# Patient Record
Sex: MALE | Race: WHITE
[De-identification: names, ages, dates, MRNs, and addresses within clinical notes are randomized per-mention and may not be internally consistent; named-entity substitution may affect disease eponyms.]

---

## 2017-03-15 ENCOUNTER — HOSPITAL ENCOUNTER (OUTPATIENT)
Dept: HOSPITAL 23 - CAMB | Age: 82
Discharge: HOME | End: 2017-03-15
Payer: MEDICARE

## 2017-03-15 DIAGNOSIS — M17.11: ICD-10-CM

## 2017-03-15 DIAGNOSIS — Z01.818: Primary | ICD-10-CM

## 2017-03-15 LAB
BLOOD UREA NITROGEN: 18 MG/DL (ref 9–23)
BUN/CREATININE RATIO: 20
CALCIUM SERUM: 9.3 MG/DL (ref 8.4–10.2)
CK MB SERPL-RTO: 14 % (ref 11–15.5)
CK MB SERPL-RTO: 31.8 G/DL (ref 30–36)
CREATININE SERUM: 0.9 MG/DL (ref 0.6–1.4)
GENTAMICIN PEAK SERPL-MCNC: NO MG/L
GLOM FILT RATE ESTIMATED: (no result) ML/MIN (ref 60–?)
GLUCOSE FASTING: 99 MG/DL (ref 70–110)
HEMATOCRIT: 44.7 % (ref 38–50)
HEMOGLOBIN: 14.2 GM/DL (ref 13–16)
INR: 0.9
KETONES UR QL: 106 MMOL/L (ref 100–111)
KETONES UR QL: 30 MMOL/L (ref 22–31)
MEAN CELL VOLUME: 90.9 FL (ref 83–96)
MEAN CORPUSCULAR HEMOGLOBIN: 28.9 PG (ref 28–34)
MEAN PLATELET VOLUME: 8.9 FL (ref 6.5–11.5)
PLATELET COUNT: 287 X10E3 (ref 140–420)
POTASSIUM: 4.7 MMOL/L (ref 3.5–5.1)
PROTHROMBIN TIME (PATIENT): 9.7 SECONDS (ref 9.6–11.5)
RED BLOOD COUNT: 4.92 X10E (ref 3.9–5.6)
SODIUM: 141 MMOL/L (ref 135–145)
URINE APPEARANCE: CLEAR
URINE BILIRUBIN: (no result)
URINE BLOOD: (no result)
URINE COLOR: YELLOW
URINE GLUCOSE: (no result) MG/DL
URINE KETONE: (no result)
URINE LEUKOCYTE ESTERASE: (no result)
URINE NITRATE: (no result)
URINE PH: 6 (ref 5–8)
URINE PROTEIN: (no result)
URINE SOURCE: (no result)
URINE SPECIFIC GRAVITY: 1.01 (ref 1–1.03)
URINE UROBILINOGEN: (no result) MG/DL
WHITE BLOOD COUNT: 8.7 X10E3 (ref 4–10.5)

## 2017-03-21 ENCOUNTER — HOSPITAL ENCOUNTER (INPATIENT)
Dept: HOSPITAL 23 - CSUR | Age: 82
LOS: 3 days | Discharge: NURSING, REHAB OR LONG-TERM CARE | DRG: 470 | End: 2017-03-24
Admitting: ORTHOPAEDIC SURGERY
Payer: MEDICARE

## 2017-03-21 DIAGNOSIS — M17.11: Primary | ICD-10-CM

## 2017-03-21 DIAGNOSIS — Z96.652: ICD-10-CM

## 2017-03-21 DIAGNOSIS — Z80.0: ICD-10-CM

## 2017-03-21 DIAGNOSIS — E78.00: ICD-10-CM

## 2017-03-21 DIAGNOSIS — Z79.82: ICD-10-CM

## 2017-03-21 LAB
INR: 0.9
PROTHROMBIN TIME (PATIENT): 9.8 SECONDS (ref 9.6–11.5)

## 2017-03-21 PROCEDURE — 0SRC0J9 REPLACEMENT OF RIGHT KNEE JOINT WITH SYNTHETIC SUBSTITUTE, CEMENTED, OPEN APPROACH: ICD-10-PCS | Performed by: ORTHOPAEDIC SURGERY

## 2017-03-21 PROCEDURE — C1776 JOINT DEVICE (IMPLANTABLE): HCPCS

## 2017-03-22 LAB
BLOOD UREA NITROGEN: 16 MG/DL (ref 9–23)
BUN/CREATININE RATIO: 16
CALCIUM SERUM: 7.9 MG/DL (ref 8.4–10.2)
CK MB SERPL-RTO: 13.8 % (ref 11–15.5)
CK MB SERPL-RTO: 32.2 G/DL (ref 30–36)
CREATININE SERUM: 1 MG/DL (ref 0.6–1.4)
GLOM FILT RATE ESTIMATED: (no result) ML/MIN (ref 60–?)
GLUCOSE FASTING: 120 MG/DL (ref 70–110)
HEMATOCRIT: 31.5 % (ref 38–50)
HEMOGLOBIN: 10.1 GM/DL (ref 13–16)
KETONES UR QL: 108 MMOL/L (ref 100–111)
KETONES UR QL: 24 MMOL/L (ref 22–31)
MEAN CELL VOLUME: 90.1 FL (ref 83–96)
MEAN CORPUSCULAR HEMOGLOBIN: 29 PG (ref 28–34)
MEAN PLATELET VOLUME: 9.4 FL (ref 6.5–11.5)
PLATELET COUNT: 220 X10E3 (ref 140–420)
POTASSIUM: 4 MMOL/L (ref 3.5–5.1)
RED BLOOD COUNT: 3.5 X10E (ref 3.9–5.6)
SODIUM: 133 MMOL/L (ref 135–145)
WHITE BLOOD COUNT: 11.5 X10E3 (ref 4–10.5)

## 2017-03-23 LAB
BLOOD UREA NITROGEN: 11 MG/DL (ref 9–23)
BLOOD UREA NITROGEN: 12 MG/DL (ref 9–23)
BUN/CREATININE RATIO: 12.22
BUN/CREATININE RATIO: 13.33
CALCIUM SERUM: 8.2 MG/DL (ref 8.4–10.2)
CALCIUM SERUM: 8.2 MG/DL (ref 8.4–10.2)
CK MB SERPL-RTO: 13.7 % (ref 11–15.5)
CK MB SERPL-RTO: 32.3 G/DL (ref 30–36)
CREATININE SERUM: 0.9 MG/DL (ref 0.6–1.4)
CREATININE SERUM: 0.9 MG/DL (ref 0.6–1.4)
GLOM FILT RATE ESTIMATED: (no result) ML/MIN (ref 60–?)
GLOM FILT RATE ESTIMATED: 80.4 ML/MIN (ref 60–?)
GLUCOSE FASTING: 110 MG/DL (ref 70–110)
GLUCOSE FASTING: 126 MG/DL (ref 70–110)
HEMATOCRIT: 33.3 % (ref 38–50)
HEMATOCRIT: 36.2 % (ref 38–50)
HEMOGLOBIN: 10.8 GM/DL (ref 13–16)
HEMOGLOBIN: 11.4 GM/DL (ref 13–16)
KETONES UR QL: 105 MMOL/L (ref 100–111)
KETONES UR QL: 26 MMOL/L (ref 22–31)
KETONES UR QL: 27 MMOL/L (ref 22–31)
KETONES UR QL: 99 MMOL/L (ref 100–111)
MAGNESIUM: 2.2 MG/DL (ref 1.6–3)
MEAN CELL VOLUME: 91.4 FL (ref 83–96)
MEAN CORPUSCULAR HEMOGLOBIN: 29.5 PG (ref 28–34)
MEAN PLATELET VOLUME: 9.7 FL (ref 6.5–11.5)
PLATELET COUNT: 215 X10E3 (ref 140–420)
POTASSIUM: 4.1 MMOL/L (ref 3.5–5.1)
POTASSIUM: 4.1 MMOL/L (ref 3.5–5.1)
RED BLOOD COUNT: 3.65 X10E (ref 3.9–5.6)
SODIUM: 132 MMOL/L (ref 135–145)
SODIUM: 138 MMOL/L (ref 135–145)
WHITE BLOOD COUNT: 11 X10E3 (ref 4–10.5)

## 2017-03-24 LAB
BLOOD UREA NITROGEN: 15 MG/DL (ref 9–23)
BUN/CREATININE RATIO: 21.42
CALCIUM SERUM: 7.9 MG/DL (ref 8.4–10.2)
CK MB SERPL-RTO: 13.9 % (ref 11–15.5)
CK MB SERPL-RTO: 32.4 G/DL (ref 30–36)
CREATININE SERUM: 0.7 MG/DL (ref 0.6–1.4)
GLOM FILT RATE ESTIMATED: 89.2 ML/MIN (ref 60–?)
GLUCOSE FASTING: 126 MG/DL (ref 70–110)
HEMATOCRIT: 32.3 % (ref 38–50)
HEMOGLOBIN: 10.5 GM/DL (ref 13–16)
KETONES UR QL: 103 MMOL/L (ref 100–111)
KETONES UR QL: 26 MMOL/L (ref 22–31)
MEAN CELL VOLUME: 90.1 FL (ref 83–96)
MEAN CORPUSCULAR HEMOGLOBIN: 29.2 PG (ref 28–34)
MEAN PLATELET VOLUME: 9.3 FL (ref 6.5–11.5)
PLATELET COUNT: 219 X10E3 (ref 140–420)
POTASSIUM: 4.4 MMOL/L (ref 3.5–5.1)
RED BLOOD COUNT: 3.59 X10E (ref 3.9–5.6)
SODIUM: 136 MMOL/L (ref 135–145)
WHITE BLOOD COUNT: 11.3 X10E3 (ref 4–10.5)

## 2021-04-21 ENCOUNTER — OFFICE (AMBULATORY)
Dept: URBAN - METROPOLITAN AREA CLINIC 75 | Facility: CLINIC | Age: 86
End: 2021-04-21
Payer: MEDICARE

## 2021-04-21 VITALS
HEIGHT: 71 IN | DIASTOLIC BLOOD PRESSURE: 76 MMHG | OXYGEN SATURATION: 98 % | HEART RATE: 89 BPM | RESPIRATION RATE: 12 BRPM | WEIGHT: 165 LBS | SYSTOLIC BLOOD PRESSURE: 142 MMHG | TEMPERATURE: 97.1 F

## 2021-04-21 DIAGNOSIS — Z86.010 PERSONAL HISTORY OF COLONIC POLYPS: ICD-10-CM

## 2021-04-21 DIAGNOSIS — D50.9 IRON DEFICIENCY ANEMIA, UNSPECIFIED: ICD-10-CM

## 2021-04-21 PROCEDURE — 99203 OFFICE O/P NEW LOW 30 MIN: CPT | Performed by: INTERNAL MEDICINE

## 2021-04-21 PROCEDURE — 99243 OFF/OP CNSLTJ NEW/EST LOW 30: CPT | Performed by: INTERNAL MEDICINE

## 2021-06-07 VITALS
WEIGHT: 165 LBS | TEMPERATURE: 97 F | OXYGEN SATURATION: 95 % | TEMPERATURE: 97.4 F | RESPIRATION RATE: 13 BRPM | SYSTOLIC BLOOD PRESSURE: 148 MMHG | DIASTOLIC BLOOD PRESSURE: 78 MMHG | OXYGEN SATURATION: 91 % | RESPIRATION RATE: 20 BRPM | OXYGEN SATURATION: 92 % | RESPIRATION RATE: 12 BRPM | SYSTOLIC BLOOD PRESSURE: 100 MMHG | RESPIRATION RATE: 8 BRPM | OXYGEN SATURATION: 94 % | HEART RATE: 76 BPM | DIASTOLIC BLOOD PRESSURE: 87 MMHG | SYSTOLIC BLOOD PRESSURE: 102 MMHG | DIASTOLIC BLOOD PRESSURE: 59 MMHG | HEART RATE: 72 BPM | DIASTOLIC BLOOD PRESSURE: 95 MMHG | HEIGHT: 71 IN | HEART RATE: 66 BPM | RESPIRATION RATE: 16 BRPM | SYSTOLIC BLOOD PRESSURE: 146 MMHG | DIASTOLIC BLOOD PRESSURE: 61 MMHG | OXYGEN SATURATION: 99 % | HEART RATE: 79 BPM | HEART RATE: 83 BPM | SYSTOLIC BLOOD PRESSURE: 170 MMHG | OXYGEN SATURATION: 96 % | SYSTOLIC BLOOD PRESSURE: 150 MMHG | SYSTOLIC BLOOD PRESSURE: 158 MMHG | DIASTOLIC BLOOD PRESSURE: 84 MMHG | HEART RATE: 65 BPM | DIASTOLIC BLOOD PRESSURE: 81 MMHG | HEART RATE: 75 BPM

## 2021-06-09 ENCOUNTER — AMBULATORY SURGICAL CENTER (AMBULATORY)
Dept: URBAN - METROPOLITAN AREA SURGERY 17 | Facility: SURGERY | Age: 86
End: 2021-06-09

## 2021-06-09 ENCOUNTER — OFFICE (AMBULATORY)
Dept: URBAN - METROPOLITAN AREA PATHOLOGY 4 | Facility: PATHOLOGY | Age: 86
End: 2021-06-09

## 2021-06-09 DIAGNOSIS — D50.9 IRON DEFICIENCY ANEMIA, UNSPECIFIED: ICD-10-CM

## 2021-06-09 DIAGNOSIS — K31.89 OTHER DISEASES OF STOMACH AND DUODENUM: ICD-10-CM

## 2021-06-09 DIAGNOSIS — K44.9 DIAPHRAGMATIC HERNIA WITHOUT OBSTRUCTION OR GANGRENE: ICD-10-CM

## 2021-06-09 LAB
GI HISTOLOGY: A. UNSPECIFIED: (no result)
GI HISTOLOGY: B. UNSPECIFIED: (no result)
GI HISTOLOGY: C. SELECT: (no result)
GI HISTOLOGY: D. UNSPECIFIED: (no result)
GI HISTOLOGY: PDF REPORT: (no result)

## 2021-06-09 PROCEDURE — 43239 EGD BIOPSY SINGLE/MULTIPLE: CPT | Performed by: INTERNAL MEDICINE

## 2021-06-09 PROCEDURE — 88342 IMHCHEM/IMCYTCHM 1ST ANTB: CPT | Performed by: INTERNAL MEDICINE

## 2021-06-09 PROCEDURE — 88305 TISSUE EXAM BY PATHOLOGIST: CPT | Performed by: INTERNAL MEDICINE

## 2021-06-29 ENCOUNTER — OFFICE (AMBULATORY)
Dept: URBAN - METROPOLITAN AREA CLINIC 75 | Facility: CLINIC | Age: 86
End: 2021-06-29

## 2021-06-29 VITALS
HEIGHT: 71 IN | RESPIRATION RATE: 12 BRPM | SYSTOLIC BLOOD PRESSURE: 124 MMHG | WEIGHT: 164 LBS | OXYGEN SATURATION: 94 % | HEART RATE: 84 BPM | DIASTOLIC BLOOD PRESSURE: 78 MMHG

## 2021-06-29 DIAGNOSIS — K31.89 OTHER DISEASES OF STOMACH AND DUODENUM: ICD-10-CM

## 2021-06-29 DIAGNOSIS — Z86.010 PERSONAL HISTORY OF COLONIC POLYPS: ICD-10-CM

## 2021-06-29 DIAGNOSIS — D50.9 IRON DEFICIENCY ANEMIA, UNSPECIFIED: ICD-10-CM

## 2021-06-29 DIAGNOSIS — K44.9 DIAPHRAGMATIC HERNIA WITHOUT OBSTRUCTION OR GANGRENE: ICD-10-CM

## 2021-06-29 PROCEDURE — 99213 OFFICE O/P EST LOW 20 MIN: CPT | Performed by: NURSE PRACTITIONER

## 2021-12-03 ENCOUNTER — OFFICE (AMBULATORY)
Dept: URBAN - METROPOLITAN AREA CLINIC 75 | Facility: CLINIC | Age: 86
End: 2021-12-03

## 2021-12-03 VITALS
OXYGEN SATURATION: 99 % | WEIGHT: 165 LBS | DIASTOLIC BLOOD PRESSURE: 84 MMHG | HEIGHT: 71 IN | HEART RATE: 71 BPM | SYSTOLIC BLOOD PRESSURE: 144 MMHG

## 2021-12-03 DIAGNOSIS — K52.9 NONINFECTIVE GASTROENTERITIS AND COLITIS, UNSPECIFIED: ICD-10-CM

## 2021-12-03 DIAGNOSIS — R19.5 OTHER FECAL ABNORMALITIES: ICD-10-CM

## 2021-12-03 DIAGNOSIS — D50.9 IRON DEFICIENCY ANEMIA, UNSPECIFIED: ICD-10-CM

## 2021-12-03 PROCEDURE — 99213 OFFICE O/P EST LOW 20 MIN: CPT | Performed by: INTERNAL MEDICINE

## 2022-11-10 ENCOUNTER — HOSPITAL ENCOUNTER (OUTPATIENT)
Dept: CARDIOLOGY | Facility: HOSPITAL | Age: 87
Discharge: HOME | End: 2022-11-10
Admitting: INTERNAL MEDICINE

## 2022-11-10 ENCOUNTER — OFFICE VISIT (OUTPATIENT)
Dept: CARDIOLOGY | Facility: CLINIC | Age: 87
End: 2022-11-10

## 2022-11-10 VITALS — BODY MASS INDEX: 22.66 KG/M2 | HEIGHT: 71 IN

## 2022-11-10 VITALS — BODY MASS INDEX: 22.59 KG/M2 | WEIGHT: 162 LBS | HEART RATE: 84 BPM

## 2022-11-10 DIAGNOSIS — R06.09 DOE (DYSPNEA ON EXERTION): ICD-10-CM

## 2022-11-10 DIAGNOSIS — I35.0 NONRHEUMATIC AORTIC VALVE STENOSIS: Primary | ICD-10-CM

## 2022-11-10 DIAGNOSIS — I35.0 NONRHEUMATIC AORTIC VALVE STENOSIS: ICD-10-CM

## 2022-11-10 LAB
AORTIC ARCH: 2.3 CM
AORTIC DIMENSIONLESS INDEX: 0.3 (DI)
ASCENDING AORTA: 3.8 CM
BH CV ECHO MEAS - ACS: 1.08 CM
BH CV ECHO MEAS - AI P1/2T: 437.4 MSEC
BH CV ECHO MEAS - AO MAX PG: 69.8 MMHG
BH CV ECHO MEAS - AO MEAN PG: 37.1 MMHG
BH CV ECHO MEAS - AO ROOT DIAM: 3.6 CM
BH CV ECHO MEAS - AO V2 MAX: 417.7 CM/SEC
BH CV ECHO MEAS - AO V2 VTI: 90.5 CM
BH CV ECHO MEAS - AVA(I,D): 1 CM2
BH CV ECHO MEAS - EDV(MOD-SP2): 91 ML
BH CV ECHO MEAS - EDV(MOD-SP4): 82 ML
BH CV ECHO MEAS - EF(MOD-BP): 56.2 %
BH CV ECHO MEAS - EF(MOD-SP2): 56 %
BH CV ECHO MEAS - EF(MOD-SP4): 53.7 %
BH CV ECHO MEAS - ESV(MOD-SP2): 40 ML
BH CV ECHO MEAS - ESV(MOD-SP4): 38 ML
BH CV ECHO MEAS - LV DIASTOLIC VOL/BSA (35-75): 44.7 CM2
BH CV ECHO MEAS - LV MAX PG: 5.8 MMHG
BH CV ECHO MEAS - LV MEAN PG: 4 MMHG
BH CV ECHO MEAS - LV SYSTOLIC VOL/BSA (12-30): 20.7 CM2
BH CV ECHO MEAS - LV V1 MAX: 120.8 CM/SEC
BH CV ECHO MEAS - LV V1 VTI: 30.1 CM
BH CV ECHO MEAS - LVOT AREA: 3.7 CM2
BH CV ECHO MEAS - LVOT DIAM: 2.16 CM
BH CV ECHO MEAS - MR MAX PG: 142.7 MMHG
BH CV ECHO MEAS - MR MAX VEL: 597.2 CM/SEC
BH CV ECHO MEAS - SI(MOD-SP2): 27.8 ML/M2
BH CV ECHO MEAS - SI(MOD-SP4): 24 ML/M2
BH CV ECHO MEAS - SUP REN AO DIAM: 1.8 CM
BH CV ECHO MEAS - SV(LVOT): 110.1 ML
BH CV ECHO MEAS - SV(MOD-SP2): 51 ML
BH CV ECHO MEAS - SV(MOD-SP4): 44 ML
BH CV ECHO MEAS - TR MAX PG: 30.8 MMHG
BH CV ECHO MEAS - TR MAX VEL: 277.3 CM/SEC
MAXIMAL PREDICTED HEART RATE: 134 BPM
SINUS: 3.5 CM
STJ: 2.9 CM
STRESS TARGET HR: 114 BPM

## 2022-11-10 PROCEDURE — 93321 DOPPLER ECHO F-UP/LMTD STD: CPT | Performed by: INTERNAL MEDICINE

## 2022-11-10 PROCEDURE — 93308 TTE F-UP OR LMTD: CPT | Performed by: INTERNAL MEDICINE

## 2022-11-10 PROCEDURE — 93308 TTE F-UP OR LMTD: CPT

## 2022-11-10 PROCEDURE — 93325 DOPPLER ECHO COLOR FLOW MAPG: CPT

## 2022-11-10 PROCEDURE — 93321 DOPPLER ECHO F-UP/LMTD STD: CPT

## 2022-11-10 PROCEDURE — 93000 ELECTROCARDIOGRAM COMPLETE: CPT | Performed by: INTERNAL MEDICINE

## 2022-11-10 PROCEDURE — 93325 DOPPLER ECHO COLOR FLOW MAPG: CPT | Performed by: INTERNAL MEDICINE

## 2022-11-10 PROCEDURE — 99204 OFFICE O/P NEW MOD 45 MIN: CPT | Performed by: INTERNAL MEDICINE

## 2022-11-10 RX ORDER — FLUTICASONE PROPIONATE 50 MCG
2 SPRAY, SUSPENSION (ML) NASAL DAILY
COMMUNITY
Start: 2022-10-24 | End: 2022-12-12

## 2022-11-10 RX ORDER — UREA 10 %
140 LOTION (ML) TOPICAL
COMMUNITY

## 2022-11-10 RX ORDER — DULOXETIN HYDROCHLORIDE 30 MG/1
30 CAPSULE, DELAYED RELEASE ORAL DAILY
COMMUNITY
Start: 2022-10-21

## 2022-11-10 RX ORDER — VEDOLIZUMAB 300 MG/5ML
300 INJECTION, POWDER, LYOPHILIZED, FOR SOLUTION INTRAVENOUS
COMMUNITY
End: 2023-02-15 | Stop reason: ALTCHOICE

## 2022-11-10 RX ORDER — MULTIVIT-MIN/IRON/FOLIC ACID/K 18-600-40
2000 CAPSULE ORAL DAILY
COMMUNITY

## 2022-11-10 RX ORDER — CELECOXIB 200 MG/1
200 CAPSULE ORAL DAILY
COMMUNITY

## 2022-11-10 RX ORDER — PSEUDOEPHEDRINE HCL 30 MG
100 TABLET ORAL 2 TIMES DAILY
COMMUNITY

## 2022-11-10 RX ORDER — CLOPIDOGREL BISULFATE 75 MG/1
75 TABLET ORAL DAILY
COMMUNITY
Start: 2022-10-21

## 2022-11-10 RX ORDER — AMOXICILLIN 875 MG/1
875 TABLET, COATED ORAL 2 TIMES DAILY
COMMUNITY
End: 2022-12-06

## 2022-11-10 NOTE — H&P (VIEW-ONLY)
Durga Canales  1935  Date of Office Visit: 11/10/22  Encounter Provider: Bran Burdick MD  Place of Service: [facility] CARDIOLOGY      CHIEF COMPLAINT:  Aortic valve stenosis  Dyspnea on exertion  History of Crohn's disease      HISTORY OF PRESENT ILLNESS:  I had the pleasure of seeing Mr. Canales in consultation today.  He is a very pleasant 87-year-old male with a medical history of Crohn's disease, chronic anemia, iron deficiency, who presents to me secondary to fatigue and dyspnea on exertion.  He underwent evaluation in the [place] system with a transthoracic echocardiogram on 8/30/2022 with evidence of at least moderate to severe aortic valve stenosis if not severe.  The mean gradient with heart was 37 mmHg across the aortic valve with a peak velocity of 3.8 m/s.  The dimensionless index was 0.3.  Patient states that over the past 6 months he has noticed more fatigue and worsening shortness of breath with minimal levels of activity.  He denies any orthopnea or PND.  He has no chest pain or recent episodes of syncope.  His last syncopal episode was in 2020    Review of Systems   Constitutional: Negative for fever and malaise/fatigue.   HENT: Negative for nosebleeds and sore throat.    Eyes: Negative for blurred vision and double vision.   Cardiovascular: Negative for chest pain, claudication, palpitations and syncope.   Respiratory: Negative for cough, shortness of breath and snoring.    Endocrine: Negative for cold intolerance, heat intolerance and polydipsia.   Skin: Negative for itching, poor wound healing and rash.   Musculoskeletal: Negative for joint pain, joint swelling, muscle weakness and myalgias.   Gastrointestinal: Negative for abdominal pain, melena, nausea and vomiting.   Neurological: Negative for light-headedness, loss of balance, seizures, vertigo and weakness.   Psychiatric/Behavioral: Negative for altered mental status and depression.          Past  "Medical History:   Diagnosis Date   • Crohn disease (HCC)    • H/O: pneumonia 2014   • High cholesterol    • Iron deficiency anemia    • Knee pain, bilateral        The following portions of the patient's history were reviewed and updated as appropriate: Social history , Family history and Surgical history     Current Outpatient Medications on File Prior to Visit   Medication Sig Dispense Refill   • atorvastatin (LIPITOR) 20 MG tablet Take 20 mg by mouth every night.     • Calcium Carb-Cholecalciferol (CALCIUM 600 + D PO) Take  by mouth.     • Multiple Vitamins-Minerals (MULTIVITAMIN ADULT PO) Take  by mouth.     • Omega-3 Fatty Acids (FISH OIL BURP-LESS PO) Take  by mouth.       No current facility-administered medications on file prior to visit.       No Known Allergies    Vitals:    11/10/22 1422   Height: 180.3 cm (71\")     Body mass index is 22.66 kg/m².   Constitutional:       Appearance: Well-developed.   Eyes:      General: No scleral icterus.     Conjunctiva/sclera: Conjunctivae normal.   HENT:      Head: Normocephalic and atraumatic.   Neck:      Thyroid: No thyromegaly.      Vascular: Normal carotid pulses. No carotid bruit, hepatojugular reflux or JVD.      Trachea: No tracheal deviation.   Pulmonary:      Effort: No respiratory distress.      Breath sounds: Normal breath sounds. No decreased breath sounds. No wheezing. No rhonchi. No rales.   Chest:      Chest wall: Not tender to palpatation.   Cardiovascular:      Normal rate. Regular rhythm.      Murmurs: There is a grade 3/6 mid to late systolic murmur.      No gallop.   Pulses:     Carotid: 2+ bilaterally.     Radial: 2+ bilaterally.     Femoral: 2+ bilaterally.     Dorsalis pedis: 2+ bilaterally.     Posterior tibial: 2+ bilaterally.  Edema:     Peripheral edema absent.   Abdominal:      General: Bowel sounds are normal. There is no distension.      Palpations: Abdomen is soft.      Tenderness: There is no abdominal tenderness.   Musculoskeletal:   "       General: No deformity.      Cervical back: Normal range of motion and neck supple. Skin:     Findings: No erythema or rash.   Neurological:      Mental Status: Alert and oriented to person, place, and time.      Sensory: No sensory deficit.   Psychiatric:         Behavior: Behavior normal.            Lab Results   Component Value Date    WBC 8.93 07/11/2022    HGB 11.6 (L) 07/11/2022    HCT 39.2 (L) 07/11/2022    MCV 91.2 07/11/2022     07/11/2022       Lab Results   Component Value Date    GLUCOSE 116 (H) 05/29/2016    BUN 26 (H) 06/03/2020    CREATININE 1.0 06/03/2020    EGFRIFNONA 91 05/29/2016    BCR 26.0 06/03/2020    K 5.0 06/03/2020    CO2 28 06/03/2020    CALCIUM 8.6 06/03/2020    ALBUMIN 3.7 06/03/2020    LABIL2 1.1 06/03/2020    AST 18 06/03/2020    ALT 11 06/03/2020       Lab Results   Component Value Date    GLUCOSE 116 (H) 05/29/2016    CALCIUM 8.6 06/03/2020     06/03/2020    K 5.0 06/03/2020    CO2 28 06/03/2020     06/03/2020    BUN 26 (H) 06/03/2020    CREATININE 1.0 06/03/2020    EGFRIFNONA 91 05/29/2016    BCR 26.0 06/03/2020    ANIONGAP 12.3 05/29/2016       ECG 12 Lead    Date/Time: 11/10/2022 3:19 PM  Performed by: Bran Burdick MD  Authorized by: Bran Burdick MD   Comparison: compared with previous ECG from 5/12/2016  Similar to previous ECG  Rhythm: sinus rhythm  Rate: normal  Conduction: non-specific intraventricular conduction delay                 DISCUSSION/SUMMARY  Very pleasant 87-year-old male who presents to me with a medical history of Crohn's disease, chronic iron deficiency anemia, dyspnea on exertion and fatigue along with moderate to severe if not severe aortic valve stenosis.  He has a mean gradient and peak velocity near the severe range and is clearly symptomatic.  His anemia seems to be at baseline and actually perhaps even a little bit improved.  His murmur certainly sounds severe with a peak late in systole.  He denies any other  high risk features including chest pain or syncope.    1.  Severe degenerative aortic valve stenosis: I do think he is at a point where we need to consider a coronary angiography along with simultaneous pressures to define his gradient a little bit more.  It is likely that he is going to need transcatheter aortic valve replacement  - Limited echo will repeat gradients today and I will also get him set up for a left and right heart catheterization to evaluate his valvular heart disease  - I do think he is symptomatic and has at least New York Heart Association class III symptoms at this point in time.    2.  Chronic heart failure with preserved ejection fraction: Appears to be euvolemic.  This is secondary to his valvular heart disease.    3.  Crohn's disease: This has been stable as of late.

## 2022-11-10 NOTE — PROGRESS NOTES
Durga Canales  1935  Date of Office Visit: 11/10/22  Encounter Provider: Bran Burdick MD  Place of Service: [facility] CARDIOLOGY      CHIEF COMPLAINT:  Aortic valve stenosis  Dyspnea on exertion  History of Crohn's disease      HISTORY OF PRESENT ILLNESS:  I had the pleasure of seeing Mr. Canales in consultation today.  He is a very pleasant 87-year-old male with a medical history of Crohn's disease, chronic anemia, iron deficiency, who presents to me secondary to fatigue and dyspnea on exertion.  He underwent evaluation in the [place] system with a transthoracic echocardiogram on 8/30/2022 with evidence of at least moderate to severe aortic valve stenosis if not severe.  The mean gradient with heart was 37 mmHg across the aortic valve with a peak velocity of 3.8 m/s.  The dimensionless index was 0.3.  Patient states that over the past 6 months he has noticed more fatigue and worsening shortness of breath with minimal levels of activity.  He denies any orthopnea or PND.  He has no chest pain or recent episodes of syncope.  His last syncopal episode was in 2020    Review of Systems   Constitutional: Negative for fever and malaise/fatigue.   HENT: Negative for nosebleeds and sore throat.    Eyes: Negative for blurred vision and double vision.   Cardiovascular: Negative for chest pain, claudication, palpitations and syncope.   Respiratory: Negative for cough, shortness of breath and snoring.    Endocrine: Negative for cold intolerance, heat intolerance and polydipsia.   Skin: Negative for itching, poor wound healing and rash.   Musculoskeletal: Negative for joint pain, joint swelling, muscle weakness and myalgias.   Gastrointestinal: Negative for abdominal pain, melena, nausea and vomiting.   Neurological: Negative for light-headedness, loss of balance, seizures, vertigo and weakness.   Psychiatric/Behavioral: Negative for altered mental status and depression.          Past  "Medical History:   Diagnosis Date   • Crohn disease (HCC)    • H/O: pneumonia 2014   • High cholesterol    • Iron deficiency anemia    • Knee pain, bilateral        The following portions of the patient's history were reviewed and updated as appropriate: Social history , Family history and Surgical history     Current Outpatient Medications on File Prior to Visit   Medication Sig Dispense Refill   • atorvastatin (LIPITOR) 20 MG tablet Take 20 mg by mouth every night.     • Calcium Carb-Cholecalciferol (CALCIUM 600 + D PO) Take  by mouth.     • Multiple Vitamins-Minerals (MULTIVITAMIN ADULT PO) Take  by mouth.     • Omega-3 Fatty Acids (FISH OIL BURP-LESS PO) Take  by mouth.       No current facility-administered medications on file prior to visit.       No Known Allergies    Vitals:    11/10/22 1422   Height: 180.3 cm (71\")     Body mass index is 22.66 kg/m².   Constitutional:       Appearance: Well-developed.   Eyes:      General: No scleral icterus.     Conjunctiva/sclera: Conjunctivae normal.   HENT:      Head: Normocephalic and atraumatic.   Neck:      Thyroid: No thyromegaly.      Vascular: Normal carotid pulses. No carotid bruit, hepatojugular reflux or JVD.      Trachea: No tracheal deviation.   Pulmonary:      Effort: No respiratory distress.      Breath sounds: Normal breath sounds. No decreased breath sounds. No wheezing. No rhonchi. No rales.   Chest:      Chest wall: Not tender to palpatation.   Cardiovascular:      Normal rate. Regular rhythm.      Murmurs: There is a grade 3/6 mid to late systolic murmur.      No gallop.   Pulses:     Carotid: 2+ bilaterally.     Radial: 2+ bilaterally.     Femoral: 2+ bilaterally.     Dorsalis pedis: 2+ bilaterally.     Posterior tibial: 2+ bilaterally.  Edema:     Peripheral edema absent.   Abdominal:      General: Bowel sounds are normal. There is no distension.      Palpations: Abdomen is soft.      Tenderness: There is no abdominal tenderness.   Musculoskeletal:   "       General: No deformity.      Cervical back: Normal range of motion and neck supple. Skin:     Findings: No erythema or rash.   Neurological:      Mental Status: Alert and oriented to person, place, and time.      Sensory: No sensory deficit.   Psychiatric:         Behavior: Behavior normal.            Lab Results   Component Value Date    WBC 8.93 07/11/2022    HGB 11.6 (L) 07/11/2022    HCT 39.2 (L) 07/11/2022    MCV 91.2 07/11/2022     07/11/2022       Lab Results   Component Value Date    GLUCOSE 116 (H) 05/29/2016    BUN 26 (H) 06/03/2020    CREATININE 1.0 06/03/2020    EGFRIFNONA 91 05/29/2016    BCR 26.0 06/03/2020    K 5.0 06/03/2020    CO2 28 06/03/2020    CALCIUM 8.6 06/03/2020    ALBUMIN 3.7 06/03/2020    LABIL2 1.1 06/03/2020    AST 18 06/03/2020    ALT 11 06/03/2020       Lab Results   Component Value Date    GLUCOSE 116 (H) 05/29/2016    CALCIUM 8.6 06/03/2020     06/03/2020    K 5.0 06/03/2020    CO2 28 06/03/2020     06/03/2020    BUN 26 (H) 06/03/2020    CREATININE 1.0 06/03/2020    EGFRIFNONA 91 05/29/2016    BCR 26.0 06/03/2020    ANIONGAP 12.3 05/29/2016       ECG 12 Lead    Date/Time: 11/10/2022 3:19 PM  Performed by: Bran Burdick MD  Authorized by: Bran Burdick MD   Comparison: compared with previous ECG from 5/12/2016  Similar to previous ECG  Rhythm: sinus rhythm  Rate: normal  Conduction: non-specific intraventricular conduction delay                 DISCUSSION/SUMMARY  Very pleasant 87-year-old male who presents to me with a medical history of Crohn's disease, chronic iron deficiency anemia, dyspnea on exertion and fatigue along with moderate to severe if not severe aortic valve stenosis.  He has a mean gradient and peak velocity near the severe range and is clearly symptomatic.  His anemia seems to be at baseline and actually perhaps even a little bit improved.  His murmur certainly sounds severe with a peak late in systole.  He denies any other  high risk features including chest pain or syncope.    1.  Severe degenerative aortic valve stenosis: I do think he is at a point where we need to consider a coronary angiography along with simultaneous pressures to define his gradient a little bit more.  It is likely that he is going to need transcatheter aortic valve replacement  - Limited echo will repeat gradients today and I will also get him set up for a left and right heart catheterization to evaluate his valvular heart disease  - I do think he is symptomatic and has at least New York Heart Association class III symptoms at this point in time.    2.  Chronic heart failure with preserved ejection fraction: Appears to be euvolemic.  This is secondary to his valvular heart disease.    3.  Crohn's disease: This has been stable as of late.

## 2022-11-11 PROBLEM — I35.0 NONRHEUMATIC AORTIC VALVE STENOSIS: Status: ACTIVE | Noted: 2022-11-11

## 2022-11-15 ENCOUNTER — HOSPITAL ENCOUNTER (OUTPATIENT)
Facility: HOSPITAL | Age: 87
Setting detail: HOSPITAL OUTPATIENT SURGERY
Discharge: HOME | End: 2022-11-15
Attending: INTERNAL MEDICINE | Admitting: INTERNAL MEDICINE

## 2022-11-15 VITALS
HEIGHT: 71 IN | WEIGHT: 140 LBS | DIASTOLIC BLOOD PRESSURE: 87 MMHG | RESPIRATION RATE: 16 BRPM | HEART RATE: 74 BPM | OXYGEN SATURATION: 94 % | SYSTOLIC BLOOD PRESSURE: 145 MMHG | BODY MASS INDEX: 19.6 KG/M2 | TEMPERATURE: 98 F

## 2022-11-15 DIAGNOSIS — I35.0 NONRHEUMATIC AORTIC VALVE STENOSIS: ICD-10-CM

## 2022-11-15 DIAGNOSIS — M17.12 PRIMARY OSTEOARTHRITIS OF LEFT KNEE: Primary | ICD-10-CM

## 2022-11-15 LAB
HCT VFR BLDA CALC: 33 % (ref 38–51)
HGB BLDA-MCNC: 11.2 G/DL (ref 12–17)
SAO2 % BLDA: 74 % (ref 95–98)
SAO2 % BLDA: 74 % (ref 95–98)
SAO2 % BLDA: 94 % (ref 95–98)

## 2022-11-15 PROCEDURE — 82810 BLOOD GASES O2 SAT ONLY: CPT

## 2022-11-15 PROCEDURE — 85018 HEMOGLOBIN: CPT

## 2022-11-15 PROCEDURE — 25010000002 PHENYLEPHRINE 10 MG/ML SOLUTION: Performed by: INTERNAL MEDICINE

## 2022-11-15 PROCEDURE — 93456 R HRT CORONARY ARTERY ANGIO: CPT | Performed by: INTERNAL MEDICINE

## 2022-11-15 PROCEDURE — C1894 INTRO/SHEATH, NON-LASER: HCPCS | Performed by: INTERNAL MEDICINE

## 2022-11-15 PROCEDURE — C1769 GUIDE WIRE: HCPCS | Performed by: INTERNAL MEDICINE

## 2022-11-15 PROCEDURE — 85014 HEMATOCRIT: CPT

## 2022-11-15 PROCEDURE — 25010000002 MIDAZOLAM PER 1 MG: Performed by: INTERNAL MEDICINE

## 2022-11-15 PROCEDURE — 0 IOPAMIDOL PER 1 ML: Performed by: INTERNAL MEDICINE

## 2022-11-15 PROCEDURE — 25010000002 HEPARIN (PORCINE) PER 1000 UNITS: Performed by: INTERNAL MEDICINE

## 2022-11-15 PROCEDURE — 25010000002 FENTANYL CITRATE (PF) 50 MCG/ML SOLUTION: Performed by: INTERNAL MEDICINE

## 2022-11-15 RX ORDER — TAMSULOSIN HYDROCHLORIDE 0.4 MG/1
1 CAPSULE ORAL DAILY
COMMUNITY
End: 2022-12-12

## 2022-11-15 RX ORDER — ACETAMINOPHEN 325 MG/1
650 TABLET ORAL EVERY 4 HOURS PRN
Status: DISCONTINUED | OUTPATIENT
Start: 2022-11-15 | End: 2022-11-15 | Stop reason: HOSPADM

## 2022-11-15 RX ORDER — SODIUM CHLORIDE 0.9 % (FLUSH) 0.9 %
10 SYRINGE (ML) INJECTION EVERY 12 HOURS SCHEDULED
Status: DISCONTINUED | OUTPATIENT
Start: 2022-11-15 | End: 2022-11-15 | Stop reason: HOSPADM

## 2022-11-15 RX ORDER — LIDOCAINE HYDROCHLORIDE 20 MG/ML
INJECTION, SOLUTION INFILTRATION; PERINEURAL
Status: DISCONTINUED | OUTPATIENT
Start: 2022-11-15 | End: 2022-11-15 | Stop reason: HOSPADM

## 2022-11-15 RX ORDER — SODIUM CHLORIDE 9 MG/ML
50 INJECTION, SOLUTION INTRAVENOUS CONTINUOUS
Status: DISCONTINUED | OUTPATIENT
Start: 2022-11-15 | End: 2022-11-15 | Stop reason: HOSPADM

## 2022-11-15 RX ORDER — FENTANYL CITRATE 50 UG/ML
INJECTION, SOLUTION INTRAMUSCULAR; INTRAVENOUS
Status: DISCONTINUED | OUTPATIENT
Start: 2022-11-15 | End: 2022-11-15 | Stop reason: HOSPADM

## 2022-11-15 RX ORDER — ONDANSETRON 4 MG/1
4 TABLET, FILM COATED ORAL EVERY 6 HOURS PRN
Status: DISCONTINUED | OUTPATIENT
Start: 2022-11-15 | End: 2022-11-15 | Stop reason: HOSPADM

## 2022-11-15 RX ORDER — ONDANSETRON 2 MG/ML
4 INJECTION INTRAMUSCULAR; INTRAVENOUS EVERY 6 HOURS PRN
Status: DISCONTINUED | OUTPATIENT
Start: 2022-11-15 | End: 2022-11-15 | Stop reason: HOSPADM

## 2022-11-15 RX ORDER — SODIUM CHLORIDE 9 MG/ML
75 INJECTION, SOLUTION INTRAVENOUS CONTINUOUS
Status: DISCONTINUED | OUTPATIENT
Start: 2022-11-15 | End: 2022-11-15 | Stop reason: HOSPADM

## 2022-11-15 RX ORDER — SODIUM CHLORIDE 0.9 % (FLUSH) 0.9 %
10 SYRINGE (ML) INJECTION AS NEEDED
Status: DISCONTINUED | OUTPATIENT
Start: 2022-11-15 | End: 2022-11-15 | Stop reason: HOSPADM

## 2022-11-15 RX ORDER — VERAPAMIL HYDROCHLORIDE 2.5 MG/ML
INJECTION, SOLUTION INTRAVENOUS
Status: DISCONTINUED | OUTPATIENT
Start: 2022-11-15 | End: 2022-11-15 | Stop reason: HOSPADM

## 2022-11-15 RX ORDER — HYDROCODONE BITARTRATE AND ACETAMINOPHEN 5; 325 MG/1; MG/1
1 TABLET ORAL EVERY 4 HOURS PRN
Status: DISCONTINUED | OUTPATIENT
Start: 2022-11-15 | End: 2022-11-15 | Stop reason: HOSPADM

## 2022-11-15 RX ORDER — HEPARIN SODIUM 1000 [USP'U]/ML
INJECTION, SOLUTION INTRAVENOUS; SUBCUTANEOUS
Status: DISCONTINUED | OUTPATIENT
Start: 2022-11-15 | End: 2022-11-15 | Stop reason: HOSPADM

## 2022-11-15 RX ORDER — PHENYLEPHRINE HYDROCHLORIDE 10 MG/ML
INJECTION INTRAVENOUS
Status: DISCONTINUED | OUTPATIENT
Start: 2022-11-15 | End: 2022-11-15 | Stop reason: HOSPADM

## 2022-11-15 RX ORDER — MIDAZOLAM HYDROCHLORIDE 1 MG/ML
INJECTION INTRAMUSCULAR; INTRAVENOUS
Status: DISCONTINUED | OUTPATIENT
Start: 2022-11-15 | End: 2022-11-15 | Stop reason: HOSPADM

## 2022-11-15 RX ADMIN — SODIUM CHLORIDE 75 ML/HR: 9 INJECTION, SOLUTION INTRAVENOUS at 12:24

## 2022-11-15 NOTE — DISCHARGE INSTRUCTIONS
[facility]  [address] [place] [address]    Coronary Angiogram (Radial/Ulnar Approach) After Care    Refer to this sheet in the next few weeks. These instructions provide you with information on caring for yourself after your procedure. Your caregiver may also give you more specific instructions. Your treatment has been planned according to current medical practices, but problems sometimes occur. Call your caregiver if you have any problems or questions after your procedure.    Home Care Instructions:  You may shower the day after the procedure. Remove the bandage (dressing) and gently wash the site with plain soap and water. Gently pat the site dry. You may apply a band aid daily for 2 days if desired.    Do not apply powder or lotion to the site.  Do not submerge the affected site in water for 3 to 5 days or until the site is completely healed.   Do not lift, push or pull anything over 5 pounds for 5 days after your procedure or as directed by your physician.  As a reference, a gallon of milk weighs 8 pounds.   Inspect the site at least twice daily. You may notice some bruising at the site and it may be tender for 1 to 2 weeks.     Increase your fluid intake for the next 2 days.    Keep arm elevated for 24 hours. For the remainder of the day, keep your arm in “Pledge of Allegiance” position when up and about.     You may drive 24 hours after the procedure unless otherwise instructed by your caregiver.  Do not operate machinery or power tools for 24 hours.  A responsible adult should be with you for the first 24 hours after you arrive home. Do not make any important legal decisions or sign legal papers for 24 hours.  Do not drink alcohol for 24 hours.    Metformin or any medications containing Metformin should not be taken for 48 hours after your procedure.      Call Your Doctor if:   You have unusual pain at the radial/ulnar (wrist) site.  You have redness, warmth, swelling, or pain at the  radial/ulnar (wrist) site.  You have drainage (other than a small amount of blood on the dressing).  `You have chills or a fever > 101.  Your arm becomes pale or dark, cool, tingly, or numb.  You develop chest pain, shortness of breath, feel faint or pass out.    You have heavy bleeding from the site, hold pressure on the site for 20 minutes.  If the bleeding stops, apply a fresh bandage and call your cardiologist.  However, if you        continue to have bleeding, call 911 and continue to apply pressure to the site.   You have any symptoms of a stroke.  Remember BE FAST  B-balance. Sudden trouble walking or loss of balance.  E-eyes.  Sudden changes in how you see or a sudden onset of a very bad headache.   F-face. Sudden weakness or loss of feeling of the face or facial droop on one side.   A-arms Sudden weakness or numbness in one arm.  One arm drifts down if they are both held out in front of you. This happens suddenly and usually on one side of the body.   S-speech.  Sudden trouble speaking, slurred speech or trouble understanding what are saying.   T-time  Time to call emergency services.  Write down the symptoms and the time they started.

## 2022-11-16 ENCOUNTER — TELEPHONE (OUTPATIENT)
Dept: CARDIOLOGY | Facility: HOSPITAL | Age: 87
End: 2022-11-16

## 2022-11-16 DIAGNOSIS — I35.0 NONRHEUMATIC AORTIC VALVE STENOSIS: Primary | ICD-10-CM

## 2022-11-16 NOTE — TELEPHONE ENCOUNTER
Called and left a message asking patient to call me so we can discuss Dr Perez recommendation for moving forward with an evaluation from the structural heart program

## 2022-11-16 NOTE — TELEPHONE ENCOUNTER
Patients daughter Zac Mcmillan calls back to discuss the evaluation process I have explained the process and will mail them our shared decision making information Mr Canales is scheduled to see Dr Morocho 12/6/22 We will work on scheduling the CTA same day

## 2022-12-06 ENCOUNTER — HOSPITAL ENCOUNTER (OUTPATIENT)
Dept: CT IMAGING | Facility: HOSPITAL | Age: 87
Discharge: HOME | End: 2022-12-06
Admitting: INTERNAL MEDICINE

## 2022-12-06 ENCOUNTER — OFFICE VISIT (OUTPATIENT)
Dept: CARDIAC SURGERY | Facility: CLINIC | Age: 87
End: 2022-12-06

## 2022-12-06 VITALS
OXYGEN SATURATION: 94 % | WEIGHT: 142 LBS | DIASTOLIC BLOOD PRESSURE: 68 MMHG | SYSTOLIC BLOOD PRESSURE: 111 MMHG | BODY MASS INDEX: 19.88 KG/M2 | HEIGHT: 71 IN | HEART RATE: 88 BPM | RESPIRATION RATE: 20 BRPM | TEMPERATURE: 97.3 F

## 2022-12-06 VITALS — HEART RATE: 80 BPM

## 2022-12-06 DIAGNOSIS — M17.12 PRIMARY OSTEOARTHRITIS OF LEFT KNEE: ICD-10-CM

## 2022-12-06 DIAGNOSIS — I35.0 NONRHEUMATIC AORTIC VALVE STENOSIS: Primary | ICD-10-CM

## 2022-12-06 DIAGNOSIS — R54 FRAILTY SYNDROME IN GERIATRIC PATIENT: ICD-10-CM

## 2022-12-06 DIAGNOSIS — I35.0 NONRHEUMATIC AORTIC VALVE STENOSIS: ICD-10-CM

## 2022-12-06 DIAGNOSIS — K50.10 CROHN'S DISEASE OF LARGE INTESTINE WITHOUT COMPLICATION: ICD-10-CM

## 2022-12-06 LAB — CREAT BLDA-MCNC: 0.9 MG/DL (ref 0.6–1.3)

## 2022-12-06 PROCEDURE — 71275 CT ANGIOGRAPHY CHEST: CPT

## 2022-12-06 PROCEDURE — 0 IOPAMIDOL PER 1 ML: Performed by: INTERNAL MEDICINE

## 2022-12-06 PROCEDURE — 99204 OFFICE O/P NEW MOD 45 MIN: CPT | Performed by: THORACIC SURGERY (CARDIOTHORACIC VASCULAR SURGERY)

## 2022-12-06 PROCEDURE — 74174 CTA ABD&PLVS W/CONTRAST: CPT

## 2022-12-06 PROCEDURE — 82565 ASSAY OF CREATININE: CPT

## 2022-12-06 RX ADMIN — IOPAMIDOL 100 ML: 755 INJECTION, SOLUTION INTRAVENOUS at 09:09

## 2022-12-07 ENCOUNTER — TELEPHONE (OUTPATIENT)
Dept: CARDIOLOGY | Facility: HOSPITAL | Age: 87
End: 2022-12-07

## 2022-12-07 ENCOUNTER — PREP FOR SURGERY (OUTPATIENT)
Dept: OTHER | Facility: HOSPITAL | Age: 87
End: 2022-12-07

## 2022-12-07 DIAGNOSIS — R79.1 ABNORMAL COAGULATION PROFILE: ICD-10-CM

## 2022-12-07 DIAGNOSIS — I35.0 AORTIC VALVE STENOSIS, ETIOLOGY OF CARDIAC VALVE DISEASE UNSPECIFIED: Primary | ICD-10-CM

## 2022-12-07 DIAGNOSIS — R79.9 ABNORMAL FINDING OF BLOOD CHEMISTRY, UNSPECIFIED: ICD-10-CM

## 2022-12-07 DIAGNOSIS — I35.0 NONRHEUMATIC AORTIC VALVE STENOSIS: Primary | ICD-10-CM

## 2022-12-07 DIAGNOSIS — I11.0 HYPERTENSIVE HEART DISEASE WITH HEART FAILURE: ICD-10-CM

## 2022-12-07 RX ORDER — CHLORHEXIDINE GLUCONATE 0.12 MG/ML
15 RINSE ORAL EVERY 12 HOURS
Status: CANCELLED | OUTPATIENT
Start: 2022-12-07 | End: 2022-12-08

## 2022-12-07 RX ORDER — CEFAZOLIN SODIUM 2 G/100ML
2 INJECTION, SOLUTION INTRAVENOUS ONCE
Status: CANCELLED | OUTPATIENT
Start: 2022-12-07 | End: 2022-12-07

## 2022-12-07 RX ORDER — CHLORHEXIDINE GLUCONATE 0.12 MG/ML
15 RINSE ORAL ONCE
Status: CANCELLED | OUTPATIENT
Start: 2022-12-07 | End: 2022-12-07

## 2022-12-07 NOTE — TELEPHONE ENCOUNTER
I spoke with Ms Tony and we discussed a date for her fathers TAVR procedure The family would like to schedulethe procedure 12/13/22 which is agreeable I will make these arrangements and call her back with specifics concerning the timing of preadmission testing.We will also discuss when patient is to discontinue his Plavix prior to the procedure

## 2022-12-08 ENCOUNTER — TELEPHONE (OUTPATIENT)
Dept: CARDIOLOGY | Facility: HOSPITAL | Age: 87
End: 2022-12-08

## 2022-12-08 NOTE — TELEPHONE ENCOUNTER
I spoke with Ms Tony and let her know Dr Burdick would like patient to stop Plavix today 5 days prior to the TAVR procedure. She verbalized understanding

## 2022-12-11 PROBLEM — K50.10 CROHN'S DISEASE OF LARGE INTESTINE WITHOUT COMPLICATION: Status: ACTIVE | Noted: 2022-12-11

## 2022-12-11 PROBLEM — R54 FRAILTY SYNDROME IN GERIATRIC PATIENT: Status: ACTIVE | Noted: 2022-12-11

## 2022-12-11 NOTE — PROGRESS NOTES
12/11/2022    Seen on 12/6/2022    Chief Complaint     Dyspnea, evaluation of aortic stenosis    History of Present Illness:       Dear Colleagues,  It was nice to see Durga Canales in consultation at your request. He is a 87 y.o. male with Crohn's disease, anemia and frailty who has developed progressive dyspnea and fatigue.  His dyspnea is on exertion and a short distance.  He was evaluated in the [place] system with an echocardiogram that showed at least severe aortic stenosis.  The mean gradient was 37 mmHg with a peak velocity of 3.9 m/s.  She denies orthopnea or PND but his shortness of breath is with minimal effort. He denies syncope, TIA, orthopnea or PND.  His Crohn's disease seems to be well managed.  He has no family history of aneurysms, dissections or connective tissue disorders.  He had a repeat echo Carton that showed a very calcific aortic valve with peak velocity of 4.2 m/s, peak velocity of 70 mmHg, mean gradient of 37 mmHg and aortic valve area of 1 cm².  The ejection fraction was 52%.  He had a cardiac cath that showed 50% mid LAD and 60% RCA stenosis.  The valve was not crossed for gradients.      Patient Active Problem List   Diagnosis   • Primary osteoarthritis of left knee   • Nonrheumatic aortic valve stenosis   • Frailty syndrome in geriatric patient   • Crohn's disease of large intestine without complication (HCC)       Past Medical History:   Diagnosis Date   • Aortic valve stenosis    • Crohn disease (HCC)    • H/O: pneumonia 2014   • High cholesterol    • Iron deficiency anemia    • Knee pain, bilateral        Past Surgical History:   Procedure Laterality Date   • CARDIAC CATHETERIZATION N/A 11/15/2022    Procedure: Right and Left Heart Cath;  Surgeon: Bran Burdick MD;  Location: [facility] CATH INVASIVE LOCATION;  Service: Cardiology;  Laterality: N/A;   • FINGER SURGERY Left    • PERIPHERAL ARTERIAL STENT GRAFT Left 2021    LEG   • [place] TOTAL KNEE ARTHROPLASTY Left 05/26/2016     Procedure: LT TOTAL KNEE ARTHROPLASTY;  Surgeon: Real Benitez MD;  Location: [facility];  Service: Orthopedics   • REPLACEMENT TOTAL KNEE Right    • TONSILLECTOMY         No Known Allergies      Current Outpatient Medications:   •  atorvastatin (LIPITOR) 20 MG tablet, Take 20 mg by mouth every night., Disp: , Rfl:   •  celecoxib (CeleBREX) 200 MG capsule, Take 1 capsule by mouth Daily., Disp: , Rfl:   •  Cholecalciferol (Vitamin D) 50 MCG (2000 UT) capsule, Take 1 capsule by mouth Daily., Disp: , Rfl:   •  clopidogrel (PLAVIX) 75 MG tablet, Take 1 tablet by mouth Daily., Disp: , Rfl:   •  docusate sodium 100 MG capsule, Take 1 capsule by mouth 2 (Two) Times a Day., Disp: , Rfl:   •  DULoxetine (CYMBALTA) 30 MG capsule, Take 1 capsule by mouth Daily. with food, Disp: , Rfl:   •  ferrous sulfate 140 (45 Fe) MG tablet controlled-release tablet, Take 1 tablet by mouth Daily With Breakfast., Disp: , Rfl:   •  fluticasone (FLONASE) 50 MCG/ACT nasal spray, 2 sprays into the nostril(s) as directed by provider Daily., Disp: , Rfl:   •  tamsulosin (FLOMAX) 0.4 MG capsule 24 hr capsule, Take 1 capsule by mouth Daily., Disp: , Rfl:   •  vedolizumab (Entyvio) 300 MG injection, Infuse 5 mL into a venous catheter Every 30 (Thirty) Days., Disp: , Rfl:     Social History     Socioeconomic History   • Marital status:    Tobacco Use   • Smoking status: Former     Packs/day: 1.00     Years: 15.00     Pack years: 15.00     Types: Cigarettes     Quit date:      Years since quittin.9   • Smokeless tobacco: Never   Substance and Sexual Activity   • Alcohol use: Not Currently   • Drug use: No   • Sexual activity: Defer       Family History   Problem Relation Age of Onset   • Brain cancer Mother 92   • Heart attack Father 92     Review of Systems   Constitutional: Positive for fatigue.   Respiratory: Positive for shortness of breath. Negative for chest tightness.    Cardiovascular: Negative for chest pain, palpitations  and leg swelling.   Neurological: Negative for weakness.   All other systems reviewed and are negative.      Physical Exam:    Vital Signs:  Weight: 64.4 kg (142 lb)   Body mass index is 19.8 kg/m².  Temp: 97.3 °F (36.3 °C)   Heart Rate: 88   BP: 111/68     Constitutional:       Appearance: Well-developed.   Eyes:      Conjunctiva/sclera: Conjunctivae normal.      Pupils: Pupils are equal, round, and reactive to light.   HENT:      Head: Normocephalic and atraumatic.      Nose: Nose normal.   Neck:      Thyroid: No thyromegaly.      Vascular: No JVD.      Lymphadenopathy: No cervical adenopathy.   Pulmonary:      Effort: Pulmonary effort is normal.      Breath sounds: Normal breath sounds. No rales.   Cardiovascular:      Normal rate. Regular rhythm.      Murmurs: There is a grade 4/6 harsh midsystolic murmur at the URSB, radiating to the neck.      No gallop.   Pulses:     Intact distal pulses. No decreased pulses.   Edema:     Peripheral edema absent.   Abdominal:      General: Bowel sounds are normal. There is no distension.      Palpations: Abdomen is soft. There is no abdominal mass.      Tenderness: There is no abdominal tenderness.   Musculoskeletal: Normal range of motion.         General: No tenderness or deformity.      Cervical back: Normal range of motion and neck supple. Skin:     General: Skin is warm and dry.      Findings: No erythema or rash.   Neurological:      Mental Status: Alert and oriented to person, place, and time.      Deep Tendon Reflexes: Reflexes are normal and symmetric.   Psychiatric:         Behavior: Behavior normal.          Assessment:     Problems Addressed this Visit        Cardiac and Vasculature    Nonrheumatic aortic valve stenosis - Primary       Gastrointestinal Abdominal     Crohn's disease of large intestine without complication (HCC)       Musculoskeletal and Injuries    Primary osteoarthritis of left knee       Symptoms and Signs    Frailty syndrome in geriatric patient    Diagnoses       Codes Comments    Nonrheumatic aortic valve stenosis    -  Primary ICD-10-CM: I35.0  ICD-9-CM: 424.1     Primary osteoarthritis of left knee     ICD-10-CM: M17.12  ICD-9-CM: 715.16     Crohn's disease of large intestine without complication (HCC)     ICD-10-CM: K50.10  ICD-9-CM: 555.1     Frailty syndrome in geriatric patient     ICD-10-CM: R54  ICD-9-CM: 797         Assessment/recommendation:     Severe nonrheumatic aortic stenosis with progressive severe symptoms in a patient that is frail and has advanced age.  Despite the low risk and STS I recommend TAVR over SAVR.  Although the STS level same as low risk I think he is in moderate risk at least.  He does have a TAVR CTA that showed feasibility in terms of vascular access in the femoral arteries and aortic valve implant ability.  I discussed the patient the risk and benefits of such a procedure and also the need for rescue if complications occur.  He verbalized understanding and agreement.  Plan is to complete the work-up and hopefully TAVR implantation soon    Thank you for allowing me to participate in his care.    Regards,    Robb Morocho M.D.  I spent over 45 minutes before, during and after the office visit in reviewing the records, examining the patient, reviewing and interpreting myself the TAVR CTA, the cardiac cath, the echocardiogram, I discussed the findings and options with the patient including the percutaneous option of TAVR, discussing the alternatives of the procedure and the need of surgery if complications occur, and spent time in documenting in the electronic record

## 2022-12-11 NOTE — H&P (VIEW-ONLY)
12/11/2022    Seen on 12/6/2022    Chief Complaint     Dyspnea, evaluation of aortic stenosis    History of Present Illness:       Dear Colleagues,  It was nice to see Durga Canales in consultation at your request. He is a 87 y.o. male with Crohn's disease, anemia and frailty who has developed progressive dyspnea and fatigue.  His dyspnea is on exertion and a short distance.  He was evaluated in the [place] system with an echocardiogram that showed at least severe aortic stenosis.  The mean gradient was 37 mmHg with a peak velocity of 3.9 m/s.  She denies orthopnea or PND but his shortness of breath is with minimal effort. He denies syncope, TIA, orthopnea or PND.  His Crohn's disease seems to be well managed.  He has no family history of aneurysms, dissections or connective tissue disorders.  He had a repeat echo Carton that showed a very calcific aortic valve with peak velocity of 4.2 m/s, peak velocity of 70 mmHg, mean gradient of 37 mmHg and aortic valve area of 1 cm².  The ejection fraction was 52%.  He had a cardiac cath that showed 50% mid LAD and 60% RCA stenosis.  The valve was not crossed for gradients.      Patient Active Problem List   Diagnosis   • Primary osteoarthritis of left knee   • Nonrheumatic aortic valve stenosis   • Frailty syndrome in geriatric patient   • Crohn's disease of large intestine without complication (HCC)       Past Medical History:   Diagnosis Date   • Aortic valve stenosis    • Crohn disease (HCC)    • H/O: pneumonia 2014   • High cholesterol    • Iron deficiency anemia    • Knee pain, bilateral        Past Surgical History:   Procedure Laterality Date   • CARDIAC CATHETERIZATION N/A 11/15/2022    Procedure: Right and Left Heart Cath;  Surgeon: Bran Burdick MD;  Location: [facility] CATH INVASIVE LOCATION;  Service: Cardiology;  Laterality: N/A;   • FINGER SURGERY Left    • PERIPHERAL ARTERIAL STENT GRAFT Left 2021    LEG   • [place] TOTAL KNEE ARTHROPLASTY Left 05/26/2016     Procedure: LT TOTAL KNEE ARTHROPLASTY;  Surgeon: Real Benitez MD;  Location: [facility];  Service: Orthopedics   • REPLACEMENT TOTAL KNEE Right    • TONSILLECTOMY         No Known Allergies      Current Outpatient Medications:   •  atorvastatin (LIPITOR) 20 MG tablet, Take 20 mg by mouth every night., Disp: , Rfl:   •  celecoxib (CeleBREX) 200 MG capsule, Take 1 capsule by mouth Daily., Disp: , Rfl:   •  Cholecalciferol (Vitamin D) 50 MCG (2000 UT) capsule, Take 1 capsule by mouth Daily., Disp: , Rfl:   •  clopidogrel (PLAVIX) 75 MG tablet, Take 1 tablet by mouth Daily., Disp: , Rfl:   •  docusate sodium 100 MG capsule, Take 1 capsule by mouth 2 (Two) Times a Day., Disp: , Rfl:   •  DULoxetine (CYMBALTA) 30 MG capsule, Take 1 capsule by mouth Daily. with food, Disp: , Rfl:   •  ferrous sulfate 140 (45 Fe) MG tablet controlled-release tablet, Take 1 tablet by mouth Daily With Breakfast., Disp: , Rfl:   •  fluticasone (FLONASE) 50 MCG/ACT nasal spray, 2 sprays into the nostril(s) as directed by provider Daily., Disp: , Rfl:   •  tamsulosin (FLOMAX) 0.4 MG capsule 24 hr capsule, Take 1 capsule by mouth Daily., Disp: , Rfl:   •  vedolizumab (Entyvio) 300 MG injection, Infuse 5 mL into a venous catheter Every 30 (Thirty) Days., Disp: , Rfl:     Social History     Socioeconomic History   • Marital status:    Tobacco Use   • Smoking status: Former     Packs/day: 1.00     Years: 15.00     Pack years: 15.00     Types: Cigarettes     Quit date:      Years since quittin.9   • Smokeless tobacco: Never   Substance and Sexual Activity   • Alcohol use: Not Currently   • Drug use: No   • Sexual activity: Defer       Family History   Problem Relation Age of Onset   • Brain cancer Mother 92   • Heart attack Father 92     Review of Systems   Constitutional: Positive for fatigue.   Respiratory: Positive for shortness of breath. Negative for chest tightness.    Cardiovascular: Negative for chest pain, palpitations  and leg swelling.   Neurological: Negative for weakness.   All other systems reviewed and are negative.      Physical Exam:    Vital Signs:  Weight: 64.4 kg (142 lb)   Body mass index is 19.8 kg/m².  Temp: 97.3 °F (36.3 °C)   Heart Rate: 88   BP: 111/68     Constitutional:       Appearance: Well-developed.   Eyes:      Conjunctiva/sclera: Conjunctivae normal.      Pupils: Pupils are equal, round, and reactive to light.   HENT:      Head: Normocephalic and atraumatic.      Nose: Nose normal.   Neck:      Thyroid: No thyromegaly.      Vascular: No JVD.      Lymphadenopathy: No cervical adenopathy.   Pulmonary:      Effort: Pulmonary effort is normal.      Breath sounds: Normal breath sounds. No rales.   Cardiovascular:      Normal rate. Regular rhythm.      Murmurs: There is a grade 4/6 harsh midsystolic murmur at the URSB, radiating to the neck.      No gallop.   Pulses:     Intact distal pulses. No decreased pulses.   Edema:     Peripheral edema absent.   Abdominal:      General: Bowel sounds are normal. There is no distension.      Palpations: Abdomen is soft. There is no abdominal mass.      Tenderness: There is no abdominal tenderness.   Musculoskeletal: Normal range of motion.         General: No tenderness or deformity.      Cervical back: Normal range of motion and neck supple. Skin:     General: Skin is warm and dry.      Findings: No erythema or rash.   Neurological:      Mental Status: Alert and oriented to person, place, and time.      Deep Tendon Reflexes: Reflexes are normal and symmetric.   Psychiatric:         Behavior: Behavior normal.          Assessment:     Problems Addressed this Visit        Cardiac and Vasculature    Nonrheumatic aortic valve stenosis - Primary       Gastrointestinal Abdominal     Crohn's disease of large intestine without complication (HCC)       Musculoskeletal and Injuries    Primary osteoarthritis of left knee       Symptoms and Signs    Frailty syndrome in geriatric patient    Diagnoses       Codes Comments    Nonrheumatic aortic valve stenosis    -  Primary ICD-10-CM: I35.0  ICD-9-CM: 424.1     Primary osteoarthritis of left knee     ICD-10-CM: M17.12  ICD-9-CM: 715.16     Crohn's disease of large intestine without complication (HCC)     ICD-10-CM: K50.10  ICD-9-CM: 555.1     Frailty syndrome in geriatric patient     ICD-10-CM: R54  ICD-9-CM: 797         Assessment/recommendation:     Severe nonrheumatic aortic stenosis with progressive severe symptoms in a patient that is frail and has advanced age.  Despite the low risk and STS I recommend TAVR over SAVR.  Although the STS level same as low risk I think he is in moderate risk at least.  He does have a TAVR CTA that showed feasibility in terms of vascular access in the femoral arteries and aortic valve implant ability.  I discussed the patient the risk and benefits of such a procedure and also the need for rescue if complications occur.  He verbalized understanding and agreement.  Plan is to complete the work-up and hopefully TAVR implantation soon    Thank you for allowing me to participate in his care.    Regards,    Robb Morocho M.D.  I spent over 45 minutes before, during and after the office visit in reviewing the records, examining the patient, reviewing and interpreting myself the TAVR CTA, the cardiac cath, the echocardiogram, I discussed the findings and options with the patient including the percutaneous option of TAVR, discussing the alternatives of the procedure and the need of surgery if complications occur, and spent time in documenting in the electronic record

## 2022-12-12 ENCOUNTER — HOSPITAL ENCOUNTER (OUTPATIENT)
Dept: GENERAL RADIOLOGY | Facility: HOSPITAL | Age: 87
Discharge: HOME | DRG: 266 | End: 2022-12-12
Payer: MEDICARE

## 2022-12-12 ENCOUNTER — PRE-ADMISSION TESTING (OUTPATIENT)
Dept: PREADMISSION TESTING | Facility: HOSPITAL | Age: 87
DRG: 266 | End: 2022-12-12
Payer: MEDICARE

## 2022-12-12 ENCOUNTER — DOCUMENTATION (OUTPATIENT)
Dept: CARDIOLOGY | Facility: HOSPITAL | Age: 87
End: 2022-12-12

## 2022-12-12 ENCOUNTER — ANESTHESIA EVENT (OUTPATIENT)
Dept: PERIOP | Facility: HOSPITAL | Age: 87
DRG: 266 | End: 2022-12-12
Payer: MEDICARE

## 2022-12-12 VITALS
DIASTOLIC BLOOD PRESSURE: 56 MMHG | SYSTOLIC BLOOD PRESSURE: 110 MMHG | HEIGHT: 71 IN | BODY MASS INDEX: 19.74 KG/M2 | RESPIRATION RATE: 18 BRPM | HEART RATE: 89 BPM | TEMPERATURE: 97.7 F | WEIGHT: 141 LBS | OXYGEN SATURATION: 96 %

## 2022-12-12 DIAGNOSIS — I35.0 AORTIC VALVE STENOSIS, ETIOLOGY OF CARDIAC VALVE DISEASE UNSPECIFIED: ICD-10-CM

## 2022-12-12 LAB
ABO GROUP BLD: NORMAL
ALBUMIN SERPL-MCNC: 3.6 G/DL (ref 3.5–5.2)
ALBUMIN/GLOB SERPL: 1.2 G/DL
ALP SERPL-CCNC: 99 U/L (ref 39–117)
ALT SERPL W P-5'-P-CCNC: 7 U/L (ref 1–41)
ANION GAP SERPL CALCULATED.3IONS-SCNC: 8.3 MMOL/L (ref 5–15)
APTT PPP: 28.9 SECONDS (ref 22.7–35.4)
AST SERPL-CCNC: 10 U/L (ref 1–40)
BASOPHILS # BLD AUTO: 0.04 10*3/MM3 (ref 0–0.2)
BASOPHILS NFR BLD AUTO: 0.4 % (ref 0–1.5)
BILIRUB SERPL-MCNC: 0.3 MG/DL (ref 0–1.2)
BILIRUB UR QL STRIP: NEGATIVE
BLD GP AB SCN SERPL QL: NEGATIVE
BUN SERPL-MCNC: 15 MG/DL (ref 8–23)
BUN/CREAT SERPL: 19.2 (ref 7–25)
CALCIUM SPEC-SCNC: 9 MG/DL (ref 8.6–10.5)
CHLORIDE SERPL-SCNC: 105 MMOL/L (ref 98–107)
CLARITY UR: CLEAR
CLOSE TME COLL+ADP + EPINEP PNL BLD: 91 % (ref 86–100)
CO2 SERPL-SCNC: 27.7 MMOL/L (ref 22–29)
COLOR UR: YELLOW
CREAT SERPL-MCNC: 0.78 MG/DL (ref 0.76–1.27)
DEPRECATED RDW RBC AUTO: 44.5 FL (ref 37–54)
EGFRCR SERPLBLD CKD-EPI 2021: 86.9 ML/MIN/1.73
EOSINOPHIL # BLD AUTO: 0.44 10*3/MM3 (ref 0–0.4)
EOSINOPHIL NFR BLD AUTO: 4.7 % (ref 0.3–6.2)
ERYTHROCYTE [DISTWIDTH] IN BLOOD BY AUTOMATED COUNT: 13.3 % (ref 12.3–15.4)
GLOBULIN UR ELPH-MCNC: 2.9 GM/DL
GLUCOSE SERPL-MCNC: 93 MG/DL (ref 65–99)
GLUCOSE UR STRIP-MCNC: NEGATIVE MG/DL
HBA1C MFR BLD: 5.4 % (ref 4.8–5.6)
HCT VFR BLD AUTO: 38.2 % (ref 37.5–51)
HGB BLD-MCNC: 12.4 G/DL (ref 13–17.7)
HGB UR QL STRIP.AUTO: NEGATIVE
IMM GRANULOCYTES # BLD AUTO: 0.06 10*3/MM3 (ref 0–0.05)
IMM GRANULOCYTES NFR BLD AUTO: 0.6 % (ref 0–0.5)
INR PPP: 0.97 (ref 0.9–1.1)
KETONES UR QL STRIP: NEGATIVE
LEUKOCYTE ESTERASE UR QL STRIP.AUTO: NEGATIVE
LYMPHOCYTES # BLD AUTO: 1.65 10*3/MM3 (ref 0.7–3.1)
LYMPHOCYTES NFR BLD AUTO: 17.8 % (ref 19.6–45.3)
MCH RBC QN AUTO: 29.5 PG (ref 26.6–33)
MCHC RBC AUTO-ENTMCNC: 32.5 G/DL (ref 31.5–35.7)
MCV RBC AUTO: 90.7 FL (ref 79–97)
MONOCYTES # BLD AUTO: 0.91 10*3/MM3 (ref 0.1–0.9)
MONOCYTES NFR BLD AUTO: 9.8 % (ref 5–12)
NEUTROPHILS NFR BLD AUTO: 6.17 10*3/MM3 (ref 1.7–7)
NEUTROPHILS NFR BLD AUTO: 66.7 % (ref 42.7–76)
NITRITE UR QL STRIP: NEGATIVE
NRBC BLD AUTO-RTO: 0 /100 WBC (ref 0–0.2)
NT-PROBNP SERPL-MCNC: 998 PG/ML (ref 0–1800)
PH UR STRIP.AUTO: 5.5 [PH] (ref 5–8)
PLATELET # BLD AUTO: 422 10*3/MM3 (ref 140–450)
PMV BLD AUTO: 8.8 FL (ref 6–12)
POTASSIUM SERPL-SCNC: 4 MMOL/L (ref 3.5–5.2)
PROT SERPL-MCNC: 6.5 G/DL (ref 6–8.5)
PROT UR QL STRIP: NEGATIVE
PROTHROMBIN TIME: 12.9 SECONDS (ref 11.7–14.2)
QT INTERVAL: 409 MS
RBC # BLD AUTO: 4.21 10*6/MM3 (ref 4.14–5.8)
RH BLD: POSITIVE
SARS-COV-2 ORF1AB RESP QL NAA+PROBE: NOT DETECTED
SODIUM SERPL-SCNC: 141 MMOL/L (ref 136–145)
SP GR UR STRIP: 1.02 (ref 1–1.03)
T&S EXPIRATION DATE: NORMAL
UROBILINOGEN UR QL STRIP: NORMAL
WBC NRBC COR # BLD: 9.27 10*3/MM3 (ref 3.4–10.8)

## 2022-12-12 PROCEDURE — 83036 HEMOGLOBIN GLYCOSYLATED A1C: CPT

## 2022-12-12 PROCEDURE — 93005 ELECTROCARDIOGRAM TRACING: CPT

## 2022-12-12 PROCEDURE — 63710000001 CHLORHEXIDINE 0.12 % SOLUTION: Performed by: NURSE PRACTITIONER

## 2022-12-12 PROCEDURE — 86901 BLOOD TYPING SEROLOGIC RH(D): CPT

## 2022-12-12 PROCEDURE — 85730 THROMBOPLASTIN TIME PARTIAL: CPT

## 2022-12-12 PROCEDURE — 63710000001 MUPIROCIN 2 % OINTMENT: Performed by: NURSE PRACTITIONER

## 2022-12-12 PROCEDURE — A9270 NON-COVERED ITEM OR SERVICE: HCPCS | Performed by: NURSE PRACTITIONER

## 2022-12-12 PROCEDURE — C9803 HOPD COVID-19 SPEC COLLECT: HCPCS

## 2022-12-12 PROCEDURE — 80053 COMPREHEN METABOLIC PANEL: CPT

## 2022-12-12 PROCEDURE — 85576 BLOOD PLATELET AGGREGATION: CPT

## 2022-12-12 PROCEDURE — U0004 COV-19 TEST NON-CDC HGH THRU: HCPCS

## 2022-12-12 PROCEDURE — 86900 BLOOD TYPING SEROLOGIC ABO: CPT

## 2022-12-12 PROCEDURE — 86850 RBC ANTIBODY SCREEN: CPT

## 2022-12-12 PROCEDURE — 83880 ASSAY OF NATRIURETIC PEPTIDE: CPT

## 2022-12-12 PROCEDURE — U0005 INFEC AGEN DETEC AMPLI PROBE: HCPCS

## 2022-12-12 PROCEDURE — 36415 COLL VENOUS BLD VENIPUNCTURE: CPT

## 2022-12-12 PROCEDURE — 85610 PROTHROMBIN TIME: CPT

## 2022-12-12 PROCEDURE — 85025 COMPLETE CBC W/AUTO DIFF WBC: CPT

## 2022-12-12 PROCEDURE — 81003 URINALYSIS AUTO W/O SCOPE: CPT

## 2022-12-12 PROCEDURE — 71046 X-RAY EXAM CHEST 2 VIEWS: CPT

## 2022-12-12 PROCEDURE — 93010 ELECTROCARDIOGRAM REPORT: CPT | Performed by: INTERNAL MEDICINE

## 2022-12-12 RX ORDER — CHLORHEXIDINE GLUCONATE 0.12 MG/ML
15 RINSE ORAL EVERY 12 HOURS
Status: DISPENSED | OUTPATIENT
Start: 2022-12-12 | End: 2022-12-13

## 2022-12-12 RX ORDER — CHLORHEXIDINE GLUCONATE 0.12 MG/ML
15 RINSE ORAL 2 TIMES DAILY
COMMUNITY
End: 2022-12-14 | Stop reason: HOSPADM

## 2022-12-12 NOTE — NURSING NOTE
I met with Mr Canales and his daughter Sania Tony today while he was here for his preadmission testing We completed the KCCQ and 15 foot walk test. We also discussed a 5am arrival time which was agreeable to them. Mr Canales lives at the [facility] independently. He ambulates with a rollator , does not require supplemental oxygen and has no issues with his skin specifically with his groin/access site. He has been provided with oral and nasal medications and instructions for use prior to his TAVR scheduled for 12/13/22. He has not been admitted to the hospital during the last twelve months for heart failure. Our contact information has been provided and they will call with any further questions

## 2022-12-12 NOTE — ANESTHESIA PREPROCEDURE EVALUATION
Anesthesia Evaluation     Patient summary reviewed and Nursing notes reviewed   NPO Solid Status: > 8 hours  NPO Liquid Status: > 6 hours           Airway   Mallampati: II  TM distance: >3 FB  Neck ROM: full  No difficulty expected  Dental - normal exam     Pulmonary - normal exam   (+) a smoker Former,   Cardiovascular     ECG reviewed  Rhythm: regular  Rate: normal    (+) valvular problems/murmurs AS, murmur, hyperlipidemia,     ROS comment: Ef 56%, severe AS by ECHO 11/22, diffuse mild CAD in LAD and RCA by cath 11/22  PE comment: MEG at [facility]    Neuro/Psych  GI/Hepatic/Renal/Endo      Musculoskeletal     (+) chronic pain,   Abdominal  - normal exam   Substance History      OB/GYN          Other   arthritis,                    Anesthesia Plan    ASA 4     MAC     (Art line/6 Fr cordis/possible GETA and post-op vent for bail-out surgery)  intravenous induction     Anesthetic plan, risks, benefits, and alternatives have been provided, discussed and informed consent has been obtained with: patient.        CODE STATUS:

## 2022-12-12 NOTE — DISCHARGE INSTRUCTIONS
Take the following medications the morning of surgery with a small sip of water:    NONE.        ARRIVAL TIME 0730.      If you are on prescription narcotic pain medication to control your pain you may also take that medication the morning of surgery.    General Instructions:  Do not eat or drink anything after midnight the night before surgery.  Infants may have breast milk up to four hours before surgery.  Infants drinking formula may drink formula up to six hours before surgery.   Patients who avoid smoking, chewing tobacco and alcohol for 4 weeks prior to surgery have a reduced risk of post-operative complications.  Quit smoking as many days before surgery as you can.  Do not smoke, use chewing tobacco or drink alcohol the day of surgery.   If applicable bring your C-PAP/ BI-PAP machine.  Bring any papers given to you in the doctor’s office.  Wear clean comfortable clothes.  Do not wear contact lenses, false eyelashes or make-up.  Bring a case for your glasses.   Bring crutches or walker if applicable.  Remove all piercings.  Leave jewelry and any other valuables at home.  Hair extensions with metal clips must be removed prior to surgery.  The Pre-Admission Testing nurse will instruct you to bring medications if unable to obtain an accurate list in Pre-Admission Testing.        If you were given a blood bank ID arm band remember to bring it with you the day of surgery.    Preventing a Surgical Site Infection:  For 2 to 3 days before surgery, avoid shaving with a razor because the razor can irritate skin and make it easier to develop an infection.    Any areas of open skin can increase the risk of a post-operative wound infection by allowing bacteria to enter and travel throughout the body.  Notify your surgeon if you have any skin wounds / rashes even if it is not near the expected surgical site.  The area will need assessed to determine if surgery should be delayed until it is healed.  The night prior to  surgery shower using a fresh bar of anti-bacterial soap (such as Dial) and clean washcloth.  Sleep in a clean bed with clean clothing.  Do not allow pets to sleep with you.  Shower on the morning of surgery using a fresh bar of anti-bacterial soap (such as Dial) and clean washcloth.  Dry with a clean towel and dress in clean clothing.  Ask your surgeon if you will be receiving antibiotics prior to surgery.  Make sure you, your family, and all healthcare providers clean their hands with soap and water or an alcohol based hand  before caring for you or your wound.    Day of surgery:  Your arrival time is approximately two hours before your scheduled surgery time.  Upon arrival, a Pre-op nurse and Anesthesiologist will review your health history, obtain vital signs, and answer questions you may have.  The only belongings needed at this time will be your home medications and if applicable your C-PAP/BI-PAP machine.  A Pre-op nurse will start an IV and you may receive medication in preparation for surgery, including something to help you relax.      Please be aware that surgery does come with discomfort.  We want to make every effort to control your discomfort so please discuss any uncontrolled symptoms with your nurse.   Your doctor will most likely have prescribed pain medications.      If you are going home after surgery you will receive individualized written care instructions before being discharged.  A responsible adult must drive you to and from the hospital on the day of your surgery and stay with you for 24 hours.  Discharge prescriptions can be filled by the hospital pharmacy during regular pharmacy hours.  If you are having surgery late in the day/evening your prescription may be e-prescribed to your pharmacy.  Please verify your pharmacy hours or chose a 24 hour pharmacy to avoid not having access to your prescription because your pharmacy has closed for the day.    If you are staying overnight  following surgery, you will be transported to your hospital room following the recovery period.  [facility] has all private rooms.    If you have any questions please call Pre-Admission Testing at [phone].  Deductibles and co-payments are collected on the day of service. Please be prepared to pay the required co-pay, deductible or deposit on the day of service as defined by your plan.    Call your surgeon immediately if you experience any of the following symptoms:  Sore Throat  Shortness of Breath or difficulty breathing  Cough  Chills  Body soreness or muscle pain  Headache  Fever  New loss of taste or smell  Do not arrive for your surgery ill.  Your procedure will need to be rescheduled to another time.  You will need to call your physician before the day of surgery to avoid any unnecessary exposure to hospital staff as well as other patients.        BACTROBAN NASAL OINTMENT  There are many germs normally in your nose. Bactroban is an ointment that will help reduce these germs. Please follow these instructions for Bactroban use:      ____The day before surgery in the evening              Date________    ____The day of surgery in the morning    Date________    **Squirt ½ package of Bactroban Ointment onto a cotton applicator and apply to inside of 1st nostril.  Squirt the remaining Bactroban and apply to the inside of the other nostril.    PERIDEX- ORAL:  Use only if your surgeon has ordered  Use the night before and morning of surgery - Swish, gargle, and spit - do not swallow.

## 2022-12-13 ENCOUNTER — ANESTHESIA (OUTPATIENT)
Dept: PERIOP | Facility: HOSPITAL | Age: 87
DRG: 266 | End: 2022-12-13
Payer: MEDICARE

## 2022-12-13 ENCOUNTER — HOSPITAL ENCOUNTER (INPATIENT)
Facility: HOSPITAL | Age: 87
LOS: 1 days | Discharge: HOME | DRG: 266 | End: 2022-12-14
Payer: MEDICARE

## 2022-12-13 DIAGNOSIS — I35.0 AORTIC VALVE STENOSIS, ETIOLOGY OF CARDIAC VALVE DISEASE UNSPECIFIED: ICD-10-CM

## 2022-12-13 DIAGNOSIS — I35.0 NONRHEUMATIC AORTIC VALVE STENOSIS: ICD-10-CM

## 2022-12-13 DIAGNOSIS — Z95.2 S/P TAVR (TRANSCATHETER AORTIC VALVE REPLACEMENT): Primary | ICD-10-CM

## 2022-12-13 LAB
ACT BLD: 137 SECONDS (ref 82–152)
ACT BLD: 155 SECONDS (ref 82–152)
ACT BLD: 293 SECONDS (ref 82–152)
BASE EXCESS BLDA CALC-SCNC: -2 MMOL/L (ref -5–5)
BASE EXCESS BLDA CALC-SCNC: -2 MMOL/L (ref -5–5)
CA-I BLDA-SCNC: ABNORMAL MMOL/L
CA-I BLDA-SCNC: ABNORMAL MMOL/L
CO2 BLDA-SCNC: 25 MMOL/L (ref 24–29)
CO2 BLDA-SCNC: 27 MMOL/L (ref 24–29)
GLUCOSE BLDC GLUCOMTR-MCNC: 121 MG/DL (ref 70–130)
GLUCOSE BLDC GLUCOMTR-MCNC: 123 MG/DL (ref 70–130)
HCO3 BLDA-SCNC: 24 MMOL/L (ref 22–26)
HCO3 BLDA-SCNC: 25.5 MMOL/L (ref 22–26)
HCT VFR BLDA CALC: 31 % (ref 38–51)
HCT VFR BLDA CALC: 31 % (ref 38–51)
HGB BLDA-MCNC: 10.5 G/DL (ref 12–17)
HGB BLDA-MCNC: 10.5 G/DL (ref 12–17)
PCO2 BLDA: 43.3 MM HG (ref 35–45)
PCO2 BLDA: 60.1 MM HG (ref 35–45)
PH BLDA: 7.24 PH UNITS (ref 7.35–7.6)
PH BLDA: 7.35 PH UNITS (ref 7.35–7.6)
PO2 BLDA: 129 MMHG (ref 80–105)
PO2 BLDA: 66 MMHG (ref 80–105)
POTASSIUM BLDA-SCNC: 3.8 MMOL/L (ref 3.5–4.9)
POTASSIUM BLDA-SCNC: 3.9 MMOL/L (ref 3.5–4.9)
SAO2 % BLDA: 92 % (ref 95–98)
SAO2 % BLDA: 98 % (ref 95–98)

## 2022-12-13 PROCEDURE — 25010000002 PROPOFOL 10 MG/ML EMULSION: Performed by: ANESTHESIOLOGY

## 2022-12-13 PROCEDURE — 25010000002 HEPARIN (PORCINE) PER 1000 UNITS: Performed by: ANESTHESIOLOGY

## 2022-12-13 PROCEDURE — C1769 GUIDE WIRE: HCPCS

## 2022-12-13 PROCEDURE — 0 IOPAMIDOL PER 1 ML

## 2022-12-13 PROCEDURE — C1894 INTRO/SHEATH, NON-LASER: HCPCS

## 2022-12-13 PROCEDURE — 33361 REPLACE AORTIC VALVE PERQ: CPT | Performed by: INTERNAL MEDICINE

## 2022-12-13 PROCEDURE — 82803 BLOOD GASES ANY COMBINATION: CPT

## 2022-12-13 PROCEDURE — 85018 HEMOGLOBIN: CPT

## 2022-12-13 PROCEDURE — 25010000002 PROTAMINE SULFATE PER 10 MG: Performed by: ANESTHESIOLOGY

## 2022-12-13 PROCEDURE — 85014 HEMATOCRIT: CPT

## 2022-12-13 PROCEDURE — 25010000002 ALBUMIN HUMAN 25% PER 50 ML: Performed by: INTERNAL MEDICINE

## 2022-12-13 PROCEDURE — C1760 CLOSURE DEV, VASC: HCPCS

## 2022-12-13 PROCEDURE — 25010000002 CEFAZOLIN IN DEXTROSE 2-4 GM/100ML-% SOLUTION: Performed by: NURSE PRACTITIONER

## 2022-12-13 PROCEDURE — C1889 IMPLANT/INSERT DEVICE, NOC: HCPCS

## 2022-12-13 PROCEDURE — 33361 REPLACE AORTIC VALVE PERQ: CPT

## 2022-12-13 PROCEDURE — C1751 CATH, INF, PER/CENT/MIDLINE: HCPCS | Performed by: ANESTHESIOLOGY

## 2022-12-13 PROCEDURE — 82947 ASSAY GLUCOSE BLOOD QUANT: CPT

## 2022-12-13 PROCEDURE — 25010000002 FENTANYL CITRATE (PF) 50 MCG/ML SOLUTION: Performed by: ANESTHESIOLOGY

## 2022-12-13 PROCEDURE — 25010000002 HEPARIN (PORCINE) PER 1000 UNITS

## 2022-12-13 PROCEDURE — P9047 ALBUMIN (HUMAN), 25%, 50ML: HCPCS | Performed by: INTERNAL MEDICINE

## 2022-12-13 PROCEDURE — B41D1ZZ FLUOROSCOPY OF AORTA AND BILATERAL LOWER EXTREMITY ARTERIES USING LOW OSMOLAR CONTRAST: ICD-10-PCS | Performed by: INTERNAL MEDICINE

## 2022-12-13 PROCEDURE — 85347 COAGULATION TIME ACTIVATED: CPT

## 2022-12-13 PROCEDURE — 25010000002 FENTANYL CITRATE (PF) 50 MCG/ML SOLUTION

## 2022-12-13 PROCEDURE — 02RF38Z REPLACEMENT OF AORTIC VALVE WITH ZOOPLASTIC TISSUE, PERCUTANEOUS APPROACH: ICD-10-PCS

## 2022-12-13 DEVICE — VLV HEART TRNSCATH SAPIEN/COMMANDER 23MM: Type: IMPLANTABLE DEVICE | Site: HEART | Status: FUNCTIONAL

## 2022-12-13 RX ORDER — ATORVASTATIN CALCIUM 20 MG/1
20 TABLET, FILM COATED ORAL NIGHTLY
Status: DISCONTINUED | OUTPATIENT
Start: 2022-12-13 | End: 2022-12-14 | Stop reason: HOSPADM

## 2022-12-13 RX ORDER — ONDANSETRON 4 MG/1
4 TABLET, FILM COATED ORAL EVERY 6 HOURS PRN
Status: DISCONTINUED | OUTPATIENT
Start: 2022-12-13 | End: 2022-12-14 | Stop reason: HOSPADM

## 2022-12-13 RX ORDER — OXYCODONE AND ACETAMINOPHEN 7.5; 325 MG/1; MG/1
1 TABLET ORAL EVERY 4 HOURS PRN
Status: DISCONTINUED | OUTPATIENT
Start: 2022-12-13 | End: 2022-12-13 | Stop reason: HOSPADM

## 2022-12-13 RX ORDER — PROMETHAZINE HYDROCHLORIDE 25 MG/1
25 SUPPOSITORY RECTAL ONCE AS NEEDED
Status: DISCONTINUED | OUTPATIENT
Start: 2022-12-13 | End: 2022-12-13 | Stop reason: HOSPADM

## 2022-12-13 RX ORDER — MULTIVIT-MIN/IRON/FOLIC ACID/K 18-600-40
2000 CAPSULE ORAL DAILY
Status: DISCONTINUED | OUTPATIENT
Start: 2022-12-13 | End: 2022-12-14 | Stop reason: CLARIF

## 2022-12-13 RX ORDER — HYDROCODONE BITARTRATE AND ACETAMINOPHEN 7.5; 325 MG/1; MG/1
1 TABLET ORAL ONCE AS NEEDED
Status: DISCONTINUED | OUTPATIENT
Start: 2022-12-13 | End: 2022-12-13 | Stop reason: HOSPADM

## 2022-12-13 RX ORDER — NOREPINEPHRINE BITARTRATE 1 MG/ML
INJECTION, SOLUTION INTRAVENOUS CONTINUOUS PRN
Status: DISCONTINUED | OUTPATIENT
Start: 2022-12-13 | End: 2022-12-13 | Stop reason: SURG

## 2022-12-13 RX ORDER — HEPARIN SODIUM 1000 [USP'U]/ML
INJECTION, SOLUTION INTRAVENOUS; SUBCUTANEOUS AS NEEDED
Status: DISCONTINUED | OUTPATIENT
Start: 2022-12-13 | End: 2022-12-13 | Stop reason: SURG

## 2022-12-13 RX ORDER — CLOPIDOGREL BISULFATE 75 MG/1
75 TABLET ORAL DAILY
Status: DISCONTINUED | OUTPATIENT
Start: 2022-12-13 | End: 2022-12-14 | Stop reason: HOSPADM

## 2022-12-13 RX ORDER — DIPHENHYDRAMINE HYDROCHLORIDE 50 MG/ML
12.5 INJECTION INTRAMUSCULAR; INTRAVENOUS
Status: DISCONTINUED | OUTPATIENT
Start: 2022-12-13 | End: 2022-12-13 | Stop reason: HOSPADM

## 2022-12-13 RX ORDER — CHLORHEXIDINE GLUCONATE 0.12 MG/ML
15 RINSE ORAL ONCE
Status: COMPLETED | OUTPATIENT
Start: 2022-12-13 | End: 2022-12-13

## 2022-12-13 RX ORDER — PHENYLEPHRINE HCL IN 0.9% NACL 1 MG/10 ML
SYRINGE (ML) INTRAVENOUS AS NEEDED
Status: DISCONTINUED | OUTPATIENT
Start: 2022-12-13 | End: 2022-12-13 | Stop reason: SURG

## 2022-12-13 RX ORDER — LIDOCAINE HYDROCHLORIDE 10 MG/ML
0.5 INJECTION, SOLUTION EPIDURAL; INFILTRATION; INTRACAUDAL; PERINEURAL ONCE AS NEEDED
Status: DISCONTINUED | OUTPATIENT
Start: 2022-12-13 | End: 2022-12-13 | Stop reason: HOSPADM

## 2022-12-13 RX ORDER — SODIUM CHLORIDE, SODIUM LACTATE, POTASSIUM CHLORIDE, CALCIUM CHLORIDE 600; 310; 30; 20 MG/100ML; MG/100ML; MG/100ML; MG/100ML
9 INJECTION, SOLUTION INTRAVENOUS CONTINUOUS
Status: DISCONTINUED | OUTPATIENT
Start: 2022-12-13 | End: 2022-12-14 | Stop reason: HOSPADM

## 2022-12-13 RX ORDER — SODIUM CHLORIDE 9 MG/ML
INJECTION, SOLUTION INTRAVENOUS CONTINUOUS PRN
Status: DISCONTINUED | OUTPATIENT
Start: 2022-12-13 | End: 2022-12-13 | Stop reason: SURG

## 2022-12-13 RX ORDER — SODIUM CHLORIDE 0.9 % (FLUSH) 0.9 %
3-10 SYRINGE (ML) INJECTION AS NEEDED
Status: DISCONTINUED | OUTPATIENT
Start: 2022-12-13 | End: 2022-12-13 | Stop reason: HOSPADM

## 2022-12-13 RX ORDER — ALBUMIN (HUMAN) 12.5 G/50ML
50 SOLUTION INTRAVENOUS ONCE
Status: COMPLETED | OUTPATIENT
Start: 2022-12-13 | End: 2022-12-13

## 2022-12-13 RX ORDER — HYDROMORPHONE HYDROCHLORIDE 1 MG/ML
0.5 INJECTION, SOLUTION INTRAMUSCULAR; INTRAVENOUS; SUBCUTANEOUS
Status: DISCONTINUED | OUTPATIENT
Start: 2022-12-13 | End: 2022-12-13 | Stop reason: HOSPADM

## 2022-12-13 RX ORDER — HYDROCODONE BITARTRATE AND ACETAMINOPHEN 5; 325 MG/1; MG/1
1 TABLET ORAL EVERY 4 HOURS PRN
Status: DISCONTINUED | OUTPATIENT
Start: 2022-12-13 | End: 2022-12-14 | Stop reason: HOSPADM

## 2022-12-13 RX ORDER — FENTANYL CITRATE 50 UG/ML
50 INJECTION, SOLUTION INTRAMUSCULAR; INTRAVENOUS
Status: DISCONTINUED | OUTPATIENT
Start: 2022-12-13 | End: 2022-12-13 | Stop reason: HOSPADM

## 2022-12-13 RX ORDER — MIDAZOLAM HYDROCHLORIDE 1 MG/ML
0.5 INJECTION INTRAMUSCULAR; INTRAVENOUS
Status: DISCONTINUED | OUTPATIENT
Start: 2022-12-13 | End: 2022-12-13 | Stop reason: HOSPADM

## 2022-12-13 RX ORDER — FENTANYL CITRATE 50 UG/ML
INJECTION, SOLUTION INTRAMUSCULAR; INTRAVENOUS AS NEEDED
Status: DISCONTINUED | OUTPATIENT
Start: 2022-12-13 | End: 2022-12-13 | Stop reason: SURG

## 2022-12-13 RX ORDER — SODIUM CHLORIDE 9 MG/ML
50 INJECTION, SOLUTION INTRAVENOUS CONTINUOUS
Status: ACTIVE | OUTPATIENT
Start: 2022-12-13 | End: 2022-12-13

## 2022-12-13 RX ORDER — LABETALOL HYDROCHLORIDE 5 MG/ML
5 INJECTION, SOLUTION INTRAVENOUS
Status: DISCONTINUED | OUTPATIENT
Start: 2022-12-13 | End: 2022-12-13 | Stop reason: HOSPADM

## 2022-12-13 RX ORDER — DULOXETIN HYDROCHLORIDE 30 MG/1
30 CAPSULE, DELAYED RELEASE ORAL DAILY
Status: DISCONTINUED | OUTPATIENT
Start: 2022-12-13 | End: 2022-12-14 | Stop reason: HOSPADM

## 2022-12-13 RX ORDER — HYDRALAZINE HYDROCHLORIDE 20 MG/ML
5 INJECTION INTRAMUSCULAR; INTRAVENOUS
Status: DISCONTINUED | OUTPATIENT
Start: 2022-12-13 | End: 2022-12-13 | Stop reason: HOSPADM

## 2022-12-13 RX ORDER — EPHEDRINE SULFATE 50 MG/ML
5 INJECTION, SOLUTION INTRAVENOUS ONCE AS NEEDED
Status: DISCONTINUED | OUTPATIENT
Start: 2022-12-13 | End: 2022-12-13 | Stop reason: HOSPADM

## 2022-12-13 RX ORDER — SODIUM CHLORIDE 0.9 % (FLUSH) 0.9 %
3 SYRINGE (ML) INJECTION EVERY 12 HOURS SCHEDULED
Status: DISCONTINUED | OUTPATIENT
Start: 2022-12-13 | End: 2022-12-13 | Stop reason: HOSPADM

## 2022-12-13 RX ORDER — CHLORHEXIDINE GLUCONATE 0.12 MG/ML
15 RINSE ORAL 2 TIMES DAILY
Status: DISCONTINUED | OUTPATIENT
Start: 2022-12-13 | End: 2022-12-14 | Stop reason: HOSPADM

## 2022-12-13 RX ORDER — PROTAMINE SULFATE 10 MG/ML
INJECTION, SOLUTION INTRAVENOUS AS NEEDED
Status: DISCONTINUED | OUTPATIENT
Start: 2022-12-13 | End: 2022-12-13 | Stop reason: SURG

## 2022-12-13 RX ORDER — PROMETHAZINE HYDROCHLORIDE 25 MG/1
25 TABLET ORAL ONCE AS NEEDED
Status: DISCONTINUED | OUTPATIENT
Start: 2022-12-13 | End: 2022-12-13 | Stop reason: HOSPADM

## 2022-12-13 RX ORDER — FAMOTIDINE 10 MG/ML
20 INJECTION, SOLUTION INTRAVENOUS ONCE
Status: COMPLETED | OUTPATIENT
Start: 2022-12-13 | End: 2022-12-13

## 2022-12-13 RX ORDER — CEFAZOLIN SODIUM 2 G/100ML
2 INJECTION, SOLUTION INTRAVENOUS ONCE
Status: COMPLETED | OUTPATIENT
Start: 2022-12-13 | End: 2022-12-13

## 2022-12-13 RX ORDER — FLUMAZENIL 0.1 MG/ML
0.2 INJECTION INTRAVENOUS AS NEEDED
Status: DISCONTINUED | OUTPATIENT
Start: 2022-12-13 | End: 2022-12-13 | Stop reason: HOSPADM

## 2022-12-13 RX ORDER — ONDANSETRON 2 MG/ML
4 INJECTION INTRAMUSCULAR; INTRAVENOUS ONCE AS NEEDED
Status: DISCONTINUED | OUTPATIENT
Start: 2022-12-13 | End: 2022-12-13 | Stop reason: HOSPADM

## 2022-12-13 RX ORDER — DIPHENHYDRAMINE HCL 25 MG
25 CAPSULE ORAL
Status: DISCONTINUED | OUTPATIENT
Start: 2022-12-13 | End: 2022-12-13 | Stop reason: HOSPADM

## 2022-12-13 RX ORDER — ACETAMINOPHEN 325 MG/1
650 TABLET ORAL EVERY 4 HOURS PRN
Status: DISCONTINUED | OUTPATIENT
Start: 2022-12-13 | End: 2022-12-14 | Stop reason: HOSPADM

## 2022-12-13 RX ORDER — ONDANSETRON 2 MG/ML
4 INJECTION INTRAMUSCULAR; INTRAVENOUS EVERY 6 HOURS PRN
Status: DISCONTINUED | OUTPATIENT
Start: 2022-12-13 | End: 2022-12-14 | Stop reason: HOSPADM

## 2022-12-13 RX ORDER — CELECOXIB 200 MG/1
200 CAPSULE ORAL DAILY
Status: DISCONTINUED | OUTPATIENT
Start: 2022-12-13 | End: 2022-12-14 | Stop reason: HOSPADM

## 2022-12-13 RX ORDER — NALOXONE HCL 0.4 MG/ML
0.2 VIAL (ML) INJECTION AS NEEDED
Status: DISCONTINUED | OUTPATIENT
Start: 2022-12-13 | End: 2022-12-13 | Stop reason: HOSPADM

## 2022-12-13 RX ORDER — LIDOCAINE HYDROCHLORIDE 20 MG/ML
INJECTION, SOLUTION INFILTRATION; PERINEURAL AS NEEDED
Status: DISCONTINUED | OUTPATIENT
Start: 2022-12-13 | End: 2022-12-13 | Stop reason: HOSPADM

## 2022-12-13 RX ADMIN — PROTAMINE SULFATE 40 MG: 10 INJECTION, SOLUTION INTRAVENOUS at 08:11

## 2022-12-13 RX ADMIN — SODIUM CHLORIDE, POTASSIUM CHLORIDE, SODIUM LACTATE AND CALCIUM CHLORIDE 9 ML/HR: 600; 310; 30; 20 INJECTION, SOLUTION INTRAVENOUS at 06:07

## 2022-12-13 RX ADMIN — CEFAZOLIN SODIUM 2 G: 2 INJECTION, SOLUTION INTRAVENOUS at 06:58

## 2022-12-13 RX ADMIN — SODIUM CHLORIDE: 9 INJECTION, SOLUTION INTRAVENOUS at 06:40

## 2022-12-13 RX ADMIN — CHLORHEXIDINE GLUCONATE 15 ML: 1.2 SOLUTION ORAL at 14:00

## 2022-12-13 RX ADMIN — CLOPIDOGREL 75 MG: 75 TABLET, FILM COATED ORAL at 14:00

## 2022-12-13 RX ADMIN — Medication 150 MCG: at 08:08

## 2022-12-13 RX ADMIN — ATORVASTATIN CALCIUM 20 MG: 20 TABLET, FILM COATED ORAL at 19:41

## 2022-12-13 RX ADMIN — DEXMEDETOMIDINE 1 MCG/KG/HR: 100 INJECTION, SOLUTION, CONCENTRATE INTRAVENOUS at 07:04

## 2022-12-13 RX ADMIN — FENTANYL CITRATE 25 MCG: 50 INJECTION, SOLUTION INTRAMUSCULAR; INTRAVENOUS at 06:44

## 2022-12-13 RX ADMIN — CHLORHEXIDINE GLUCONATE 15 ML: 1.2 SOLUTION ORAL at 19:42

## 2022-12-13 RX ADMIN — HEPARIN SODIUM 12000 UNITS: 1000 INJECTION INTRAVENOUS; SUBCUTANEOUS at 07:47

## 2022-12-13 RX ADMIN — PROPOFOL 50 MCG/KG/MIN: 10 INJECTION, EMULSION INTRAVENOUS at 07:00

## 2022-12-13 RX ADMIN — Medication 100 MCG: at 07:49

## 2022-12-13 RX ADMIN — Medication 100 MCG: at 08:01

## 2022-12-13 RX ADMIN — FAMOTIDINE 20 MG: 10 INJECTION INTRAVENOUS at 06:04

## 2022-12-13 RX ADMIN — CHLORHEXIDINE GLUCONATE 15 ML: 1.2 SOLUTION ORAL at 06:04

## 2022-12-13 RX ADMIN — FENTANYL CITRATE 25 MCG: 50 INJECTION, SOLUTION INTRAMUSCULAR; INTRAVENOUS at 06:47

## 2022-12-13 RX ADMIN — MUPIROCIN 1 APPLICATION: 20 OINTMENT TOPICAL at 19:42

## 2022-12-13 RX ADMIN — ACETAMINOPHEN 650 MG: 325 TABLET, FILM COATED ORAL at 19:41

## 2022-12-13 RX ADMIN — NOREPINEPHRINE BITARTRATE 0.03 MCG/KG/MIN: 1 INJECTION, SOLUTION, CONCENTRATE INTRAVENOUS at 07:11

## 2022-12-13 RX ADMIN — SODIUM CHLORIDE: 9 INJECTION, SOLUTION INTRAVENOUS at 07:17

## 2022-12-13 RX ADMIN — ALBUMIN HUMAN 50 G: 0.25 SOLUTION INTRAVENOUS at 10:47

## 2022-12-13 NOTE — ANESTHESIA PROCEDURE NOTES
Central Line      Patient reassessed immediately prior to procedure    Patient location during procedure: OR  Start time: 12/13/2022 7:05 AM  Stop Time:12/13/2022 7:16 AM  Indications: vascular access  Staff  Anesthesiologist: Sim Damico MD  Preanesthetic Checklist  Completed: patient identified, IV checked, site marked, risks and benefits discussed, surgical consent, monitors and equipment checked, pre-op evaluation and timeout performed  Central Line Prep  Sterile Tech:cap, gloves, gown, mask and sterile barriers  Prep: chloraprep  Patient monitoring: continuous pulse oximetry, blood pressure monitoring and EKG  Central Line Procedure  Laterality:right  Location:internal jugular  Catheter Type:Cordis and single lumen  Catheter Size:6 Fr  Guidance:ultrasound was used but no picture was saved  PROCEDURE NOTE/ULTRASOUND INTERPRETATION.  Using ultrasound guidance the potential vascular sites for insertion of the catheter were visualized to determine the patency of the vessel to be used for vascular access.  After selecting the appropriate site for insertion, the needle was visualized under ultrasound being inserted into the internal jugular vein, followed by ultrasound confirmation of wire and catheter placement. There were no abnormalities seen on ultrasound; an image was taken; and the patient tolerated the procedure with no complications. Images: still images not obtained  Assessment  Post procedure:biopatch applied, line sutured and occlusive dressing applied  Assessement:blood return through all ports, free fluid flow, placement verified by x-ray, Dion Test, no pneumothorax on x-ray and chest x-ray ordered  Complications:no  Patient Tolerance:patient tolerated the procedure well with no apparent complications  Additional Notes  Ultrasound guidance was used for needle placement but no picture was saved

## 2022-12-13 NOTE — CASE MANAGEMENT/SOCIAL WORK
Discharge Planning Assessment  [facility]     Patient Name: Durga Canales  MRN: [id number]  Today's Date: 12/13/2022    Admit Date: 12/13/2022    Plan: Return to [facility]   Discharge Needs Assessment     Row Name 12/13/22 1532       Living Environment    People in Home facility resident    Name(s) of People in Home [facility]    Current Living Arrangements assisted living facility    Primary Care Provided by self    Provides Primary Care For no one    Family Caregiver if Needed child(chloe), adult    Family Caregiver Names Sania Chavo [facility] [phone]    Quality of Family Relationships supportive    Able to Return to Prior Arrangements yes       Resource/Environmental Concerns    Resource/Environmental Concerns none    Transportation Concerns none       Transition Planning    Patient/Family Anticipates Transition to home    Patient/Family Anticipated Services at Transition none    Transportation Anticipated family or friend will provide       Discharge Needs Assessment    Readmission Within the Last 30 Days no previous admission in last 30 days    Current Outpatient/Agency/Support Group assisted living facility    Equipment Currently Used at Home shower chair;grab bar;walker, rolling;cane, straight    Concerns to be Addressed discharge planning    Anticipated Changes Related to Illness none    Discharge Facility/Level of Care Needs assisted living facility    Provided Post Acute Provider List? N/A    N/A Provider List Comment Plan is to return to [facility]    Provided Post Acute Provider Quality & Resource List? N/A               Discharge Plan     Row Name 12/13/22 1544       Plan    Plan Return to [facility]    Patient/Family in Agreement with Plan yes    Plan Comments IMM 12/13/2022. Met with patient and Sania morataya [phone] at bedside, patient granted me permission to speak with him while family remained in the room. Face sheet verified.  Prior to admission patient was a resident at [facility]. Patient uses a rolling walker.  Patient uses the [facility] on [address], denies any issues affording or remembering to take his medications. Patient declined Meds to Beds. Patient has a living will and Sania Chavo [facility] [phone] and Zac Mcmillan are his health care surrogates.  Discharge plans are to return to [facility]- spoke with Luz Elena should be fine for patient to return, she is following. Family to transport. Will continue to monitor for new or changing discharge needs.  Rocio MILLER RN CCP              Continued Care and Services - Admitted Since 12/13/2022     Destination     Service Provider Request Status Selected Services Address Phone Fax Patient Preferred    [facility] Pending - Request Sent N/A [address] [facility] [address] [phone] --       Internal Comment last updated by Rocio Knight RN 12/13/2022 1542    Assisted Living                         Expected Discharge Date and Time     Expected Discharge Date Expected Discharge Time    Dec 14, 2022          Demographic Summary     Row Name 12/13/22 1531       General Information    Admission Type inpatient    Arrived From home    Required Notices Provided Important Message from Medicare    Referral Source admission list    Reason for Consult discharge planning    Preferred Language English       Contact Information    Permission Granted to Share Info With family/designee  Sania Chavo [facility] [phone]               Functional Status     Row Name 12/13/22 1531       Functional Status    Usual Activity Tolerance good    Current Activity Tolerance good       Functional Status, IADL    Medications independent    Meal Preparation assistive person    Housekeeping assistive person    Laundry assistive person    Shopping assistive person       Mental Status    General Appearance WDL WDL       Mental Status Summary    Recent Changes in  Mental Status/Cognitive Functioning no changes       Employment/    Employment Status retired               Psychosocial    No documentation.                Abuse/Neglect    No documentation.                Legal    No documentation.                Substance Abuse    No documentation.                Patient Forms    No documentation.                   Rocio Knight RN

## 2022-12-13 NOTE — ANESTHESIA PROCEDURE NOTES
TAVR JAKUB    Procedure Performed: TAVR JAKUB       Start Time:        End Time:

## 2022-12-13 NOTE — DISCHARGE PLACEMENT REQUEST
Durga Canales (87 y.o. Male)     Date of Birth   1935    Social Security Number       [address]   [address] [place] [id number]    Home Phone   [phone]    MRN   [id number]       Christianity   Synagogue    Marital Status                               Admission Date   12/13/22    Admission Type   Elective    Admitting Provider   Anuel Crews MD    Attending Provider   Anuel Crews MD    Department, Room/Bed   [address] CVI, 2204/1       Discharge Date       Discharge Disposition       Discharge Destination                               Attending Provider: Anuel Crews MD    Allergies: No Known Allergies    Isolation: None   Infection: None   Code Status: Prior    Ht: 180.3 cm (71\")   Wt: 64 kg (141 lb)    Admission Cmt: None   Principal Problem: Nonrheumatic aortic valve stenosis [I35.0]                 Active Insurance as of 12/13/2022     Primary Coverage     Payor Plan Insurance Group Employer/Plan Group    MEDICARE MEDICARE A & B      Payor Plan Address Payor Plan Phone Number Payor Plan Fax Number Effective Dates    [address] [phone]  9/1/2001 - None Entered    [facility] [id number]       Subscriber Name Subscriber Birth Date Member ID       DURGA CANALES 1935 [id number]           Secondary Coverage     Payor Plan Insurance Group Employer/Plan Group    AETNA COMMERCIAL AETNA [id number]     Payor Plan Address Payor Plan Phone Number Payor Plan Fax Number Effective Dates    [address] [phone]  1/1/1999 - None Entered    [facility] [address]       Subscriber Name Subscriber Birth Date Member ID       DURGA CANALES 1935 [id number]                 Emergency Contacts      (Rel.) Home Phone Work Phone Mobile Phone    Sania Tony (Daughter) [phone] -- [phone]    Zac Mcmillan (Daughter) [phone] -- [phone]

## 2022-12-13 NOTE — ANESTHESIA PROCEDURE NOTES
Arterial Line      Patient reassessed immediately prior to procedure    Patient location during procedure: holding area  Start time: 12/13/2022 6:53 AM  Stop Time:12/13/2022 6:57 AM       Line placed for hemodynamic monitoring, ABGs/Labs/ISTAT and MD/Surgeon request.  Performed By   Anesthesiologist: Sim Damico MD   Preanesthetic Checklist  Completed: patient identified, IV checked, site marked, risks and benefits discussed, surgical consent, monitors and equipment checked, pre-op evaluation and timeout performed  Arterial Line Prep    Sterile Tech: cap, gloves, mask and sterile barriers  Prep: ChloraPrep  Patient monitoring: blood pressure monitoring, continuous pulse oximetry and EKG  Arterial Line Procedure   Laterality:left  Location:  radial artery  Catheter size: 20 G   PROCEDURE NOTE/ULTRASOUND INTERPRETATION.  Using ultrasound guidance the potential vascular sites for insertion of the catheter were visualized to determine the patency of the vessel to be used for vascular access.  After selecting the appropriate site for insertion, the needle was visualized under ultrasound being inserted into the radial artery, followed by ultrasound confirmation of wire and catheter placement. There were no abnormalities seen on ultrasound; an image was taken; and the patient tolerated the procedure with no complications.   Number of attempts: 1  Successful placement: yes Images: still images not obtained  Post Assessment   Dressing Type: wrist guard applied, occlusive dressing applied and secured with tape.   Complications no  Circ/Move/Sens Assessment: normal and unchanged.   Patient Tolerance: patient tolerated the procedure well with no apparent complications

## 2022-12-13 NOTE — OP NOTE
TAVR OPERATIVE REPORT    CO-SURGEON: Anuel Crews MD  CO-SURGEON: Bran Burdick MD    DIAGNOSIS: Severe symptomatic aortic stenosis.     POSTOP DIAGNOSIS: Severe symptomatic aortic stenosis.     PRESENT CO-MORBIDITIES:  Severe AD  Chron' s  Anemia  HLD  Iron deficiency  HxDVT    PROCEDURE: Elective procedure in hybrid operating room     1. Transcatheter aortic valve replacement (TAVR) utilizing a 14 English eSheath and a 23 mm Noe 3 transcatheter heart valve  2. Percutaneous right femoral arterial access   3. Percutaneous left femoral arterial and venous access  4. Abdominal aortography and bilateral lower extremity angiography  5. Transvenous pacing using a 5-English balloon-tip pacer  6. Supravalvular aortogram  7. Transthoracic echocardiogram  8. Arterial line placement  9. Central venous catheter placement      INDICATIONS FOR OPERATION: The patient is a 87-year-old with New York Heart Association Class II symptoms and STS score of 5.2%. The patient was determined to be best suited for TAVR by the multidisciplinary heart team due to age, comorbidities and fraility    FDA TAVR INDICATIONS: The patient was judged to be suitable for TAVR by the multidisciplinary team as reviewed by two cardiac surgeons, an interventional cardiologist, and the rest of the TAVR team.  The patient, family and team were in agreement that the case would convert to an open surgical AVR in the event of unsuccessful TAVR. The patient’s co-morbidities would not preclude the expected benefit from correction of the aortic stenosis by TAVR based on the team discussion. The patient will be enrolled in the STS/ACC TAVR TVT registry.     DESCRIPTION: Dr. Bran Burdick (interventional cardiologist) and Dr. Anuel Crews (cardiothoracic surgeon) performed the procedure together in all preoperative and operative aspects. The patient was taken to the hybrid OR. A radial art line, central venous access, and Ayala catheter  were inserted preoperatively. Anesthesia was administered without apparent complication. The patient was prepped and draped in the usual sterile fashion.      Using a micropuncture technique, access was obtained in the right femoral artery and a microsheath was inserted.  Angiography through the microsheath confirmed good position of the arterial access point.  Two Perclose sutures were deployed in offset manner in the right femoral artery using the \"Preclose\" technique.  An 8 Djiboutian sheath was inserted.      A similar technique was used to obtain access in the left common femoral artery.    A 6 Djiboutian sheath was inserted.  Access was obtained in the left femoral vein and a 6 Djiboutian sheath was inserted.   A 5 Djiboutian pacing catheter was directed to the RV apex and was tested.  A 6 Djiboutian pigtail was directed to the right coronary cusp.  Angiography was performed using 20 cc of contrast.     We inserted an 5 Fr FR 4 catheter to the aortic arch.  Through this, we inserted a Lunderquist wire.  Over this we performed sequential dilation until a 14 Djiboutian E-sheath could be inserted to the abdominal aorta.  We exchanged for an 6 Djiboutian AL-1 catheter and crossed the valve easily using a straight wire.  We exchanged for a pigtail catheter in the LV, then optimized position in the LV apex.  We then inserted an Amplatz Extra Stiff guidewire.    The orientation of a 23 mm Almazan S3 TAVR valve was confirmed by multiple physicians, then was loaded in the sheath and was advanced to the descending aorta.  The valve was centered on the balloon easily, then was advanced across the aortic arch.     The THV was then placed and repositioned in the aortic annulus, and placement was confirmed by an aortogram. The co-operators were all in agreement for valve positioning prior to deployment. In a coordinated fashion, rapid ventricular pacing and the TAVR valve was deployed for a total of 5 seconds.  The valve delivery balloon was then  deflated, then was withdrawn and pacing was discontinued. We exchanged the TAVR delivery system for a pigtail catheter.  Proper valve placement, orientation and degree of regurgitation was then evaluated by transthoracic echocardiogram and aortography. The co-operators agreed that no further intervention was necessary.    We then withdrew the eSheath to the distal external iliac artery with the wire still in place. From the contralateral side, the pigtail catheter was advanced into the distal abdomen and abdominal aortography was performed. All parties were in agreement that there was no flow-limiting vascular trauma or extravasation of dye, at which point closure of the arteriotomy was performed with our two Perclose devices.  The venous sheath and temporary pacemaker were then removed. Finally, our contralateral access was removed and closed using a 6 Setswana Angioseal. The patient left in the care of the anesthesia team for extubation and hemodynamic monitoring. The patient was transported to the Open Heart Recovery Unit for further monitoring and care.       1. Hemodynamics:  Post TAVR aortic gradient: 8.  Post TAVR AI: none . Post JAKE: 2.5.     CONTRAST USED: 110 mL.     FLUROSCOPY TIME:  8.4 min    Air KERMA:  [address]    EBL: minimal    SPECIMENS: none    CONCLUSIONS:  Successful deployment of a 23 mm Noe 3 transcatheter aortic heart valve.

## 2022-12-13 NOTE — CONSULTS
Met with patient, discussed benefits of cardiac rehab. Provided phase II information along with the contact information for cardiac rehab here at [facility]. Patient unsure if he will attend, stated he will need to discuss with the people who would be driving him.

## 2022-12-13 NOTE — ANESTHESIA POSTPROCEDURE EVALUATION
"Patient: Durga Canales    Procedure Summary     Date: 12/13/22 Room / Location: [address] CARDIOVASCULAR OPERATING ROOM    Anesthesia Start: 0639 Anesthesia Stop: 0848    Procedures:       TTE TRANSFEMORAL TRANSCATHETER AORTIC VALVE REPLACEMENT PERCUTANEOUS APPROACH (Chest)      Transfemoral Transcatheter Aortic Valve Replacement with intraoperative TTE and possible open surgical rescue Diagnosis:       Nonrheumatic aortic valve stenosis      (Nonrheumatic aortic valve stenosis [I35.0])    Surgeons: Anuel Crews MD; Bran Burdick MD Provider: Sim Damico MD    Anesthesia Type: MAC ASA Status: 4          Anesthesia Type: MAC    Vitals  Vitals Value Taken Time   BP 99/62 12/13/22 1216   Temp 36.7 °C (98 °F) 12/13/22 1130   Pulse 65 12/13/22 1226   Resp 18 12/13/22 1215   SpO2 97 % 12/13/22 1226   Vitals shown include unvalidated device data.        Post Anesthesia Care and Evaluation    Patient location during evaluation: bedside  Patient participation: complete - patient participated  Level of consciousness: awake and alert  Pain management: adequate    Airway patency: patent  Anesthetic complications: No anesthetic complications    Cardiovascular status: acceptable  Respiratory status: acceptable  Hydration status: acceptable    Comments: BP 99/62   Pulse 62   Temp 36.7 °C (98 °F) (Oral)   Resp 18   Ht 180.3 cm (71\")   Wt 64 kg (141 lb 1.5 oz)   SpO2 96%   BMI 19.68 kg/m²       "

## 2022-12-14 ENCOUNTER — APPOINTMENT (OUTPATIENT)
Dept: CARDIOLOGY | Facility: HOSPITAL | Age: 87
DRG: 266 | End: 2022-12-14
Payer: MEDICARE

## 2022-12-14 VITALS
WEIGHT: 141 LBS | BODY MASS INDEX: 19.74 KG/M2 | TEMPERATURE: 97.9 F | HEIGHT: 71 IN | SYSTOLIC BLOOD PRESSURE: 91 MMHG | HEART RATE: 65 BPM | OXYGEN SATURATION: 93 % | DIASTOLIC BLOOD PRESSURE: 48 MMHG | RESPIRATION RATE: 18 BRPM

## 2022-12-14 LAB
ANION GAP SERPL CALCULATED.3IONS-SCNC: 7 MMOL/L (ref 5–15)
AORTIC DIMENSIONLESS INDEX: 0.7 (DI)
BH CV ECHO MEAS - AO MAX PG: 24.9 MMHG
BH CV ECHO MEAS - AO MEAN PG: 11.9 MMHG
BH CV ECHO MEAS - AO ROOT DIAM: 2.8 CM
BH CV ECHO MEAS - AO V2 MAX: 249.3 CM/SEC
BH CV ECHO MEAS - AO V2 VTI: 46.8 CM
BH CV ECHO MEAS - AVA(I,D): 2.22 CM2
BH CV ECHO MEAS - EDV(CUBED): 69.9 ML
BH CV ECHO MEAS - EDV(MOD-SP2): 116 ML
BH CV ECHO MEAS - EDV(MOD-SP4): 92 ML
BH CV ECHO MEAS - EF(MOD-BP): 62.5 %
BH CV ECHO MEAS - EF(MOD-SP2): 62.9 %
BH CV ECHO MEAS - EF(MOD-SP4): 62 %
BH CV ECHO MEAS - ESV(CUBED): 11.5 ML
BH CV ECHO MEAS - ESV(MOD-SP2): 43 ML
BH CV ECHO MEAS - ESV(MOD-SP4): 35 ML
BH CV ECHO MEAS - FS: 45.1 %
BH CV ECHO MEAS - IVS/LVPW: 0.92 CM
BH CV ECHO MEAS - IVSD: 1.18 CM
BH CV ECHO MEAS - LAT PEAK E' VEL: 7.9 CM/SEC
BH CV ECHO MEAS - LV DIASTOLIC VOL/BSA (35-75): 50.6 CM2
BH CV ECHO MEAS - LV MASS(C)D: 181 GRAMS
BH CV ECHO MEAS - LV MAX PG: 7.9 MMHG
BH CV ECHO MEAS - LV MEAN PG: 4.8 MMHG
BH CV ECHO MEAS - LV SYSTOLIC VOL/BSA (12-30): 19.3 CM2
BH CV ECHO MEAS - LV V1 MAX: 140.6 CM/SEC
BH CV ECHO MEAS - LV V1 VTI: 30 CM
BH CV ECHO MEAS - LVIDD: 4.1 CM
BH CV ECHO MEAS - LVIDS: 2.26 CM
BH CV ECHO MEAS - LVOT AREA: 3.5 CM2
BH CV ECHO MEAS - LVOT DIAM: 2.1 CM
BH CV ECHO MEAS - LVPWD: 1.29 CM
BH CV ECHO MEAS - MED PEAK E' VEL: 6.1 CM/SEC
BH CV ECHO MEAS - MR MAX PG: 68.1 MMHG
BH CV ECHO MEAS - MR MAX VEL: 412.7 CM/SEC
BH CV ECHO MEAS - MV A MAX VEL: 95.3 CM/SEC
BH CV ECHO MEAS - MV DEC SLOPE: 316.3 CM/SEC2
BH CV ECHO MEAS - MV DEC TIME: 0.28 MSEC
BH CV ECHO MEAS - MV E MAX VEL: 87.7 CM/SEC
BH CV ECHO MEAS - MV E/A: 0.92
BH CV ECHO MEAS - MV MAX PG: 4.6 MMHG
BH CV ECHO MEAS - MV MEAN PG: 2.01 MMHG
BH CV ECHO MEAS - MV P1/2T: 83.5 MSEC
BH CV ECHO MEAS - MV V2 VTI: 29.7 CM
BH CV ECHO MEAS - MVA(P1/2T): 2.6 CM2
BH CV ECHO MEAS - MVA(VTI): 3.5 CM2
BH CV ECHO MEAS - PA V2 MAX: 154.7 CM/SEC
BH CV ECHO MEAS - QP/QS: 0.38
BH CV ECHO MEAS - RAP SYSTOLE: 3 MMHG
BH CV ECHO MEAS - RV MAX PG: 2.33 MMHG
BH CV ECHO MEAS - RV V1 MAX: 76.3 CM/SEC
BH CV ECHO MEAS - RV V1 VTI: 14.3 CM
BH CV ECHO MEAS - RVOT DIAM: 1.87 CM
BH CV ECHO MEAS - RVSP: 31.9 MMHG
BH CV ECHO MEAS - SI(MOD-SP2): 40.2 ML/M2
BH CV ECHO MEAS - SI(MOD-SP4): 31.4 ML/M2
BH CV ECHO MEAS - SV(LVOT): 103.6 ML
BH CV ECHO MEAS - SV(MOD-SP2): 73 ML
BH CV ECHO MEAS - SV(MOD-SP4): 57 ML
BH CV ECHO MEAS - SV(RVOT): 39.3 ML
BH CV ECHO MEAS - TR MAX PG: 28.9 MMHG
BH CV ECHO MEAS - TR MAX VEL: 269 CM/SEC
BH CV ECHO MEASUREMENTS AVERAGE E/E' RATIO: 12.53
BH CV ECHO SHUNT ASSESSMENT PERFORMED (HIDDEN SCRIPTING): 1
BH CV XLRA - TDI S': 12.8 CM/SEC
BUN SERPL-MCNC: 16 MG/DL (ref 8–23)
BUN/CREAT SERPL: 20.3 (ref 7–25)
CALCIUM SPEC-SCNC: 8 MG/DL (ref 8.6–10.5)
CHLORIDE SERPL-SCNC: 107 MMOL/L (ref 98–107)
CO2 SERPL-SCNC: 26 MMOL/L (ref 22–29)
CREAT SERPL-MCNC: 0.79 MG/DL (ref 0.76–1.27)
DEPRECATED RDW RBC AUTO: 46.8 FL (ref 37–54)
EGFRCR SERPLBLD CKD-EPI 2021: 86.5 ML/MIN/1.73
ERYTHROCYTE [DISTWIDTH] IN BLOOD BY AUTOMATED COUNT: 13.7 % (ref 12.3–15.4)
GLUCOSE SERPL-MCNC: 95 MG/DL (ref 65–99)
HCT VFR BLD AUTO: 32.8 % (ref 37.5–51)
HGB BLD-MCNC: 10.3 G/DL (ref 13–17.7)
LEFT ATRIUM VOLUME INDEX: 37.6 ML/M2
MAXIMAL PREDICTED HEART RATE: 134 BPM
MCH RBC QN AUTO: 29 PG (ref 26.6–33)
MCHC RBC AUTO-ENTMCNC: 31.4 G/DL (ref 31.5–35.7)
MCV RBC AUTO: 92.4 FL (ref 79–97)
PLATELET # BLD AUTO: 325 10*3/MM3 (ref 140–450)
PMV BLD AUTO: 9.1 FL (ref 6–12)
POTASSIUM SERPL-SCNC: 4 MMOL/L (ref 3.5–5.2)
RBC # BLD AUTO: 3.55 10*6/MM3 (ref 4.14–5.8)
SINUS: 2.6 CM
SODIUM SERPL-SCNC: 140 MMOL/L (ref 136–145)
STRESS TARGET HR: 114 BPM
WBC NRBC COR # BLD: 12.7 10*3/MM3 (ref 3.4–10.8)

## 2022-12-14 PROCEDURE — 99232 SBSQ HOSP IP/OBS MODERATE 35: CPT | Performed by: INTERNAL MEDICINE

## 2022-12-14 PROCEDURE — 93306 TTE W/DOPPLER COMPLETE: CPT | Performed by: INTERNAL MEDICINE

## 2022-12-14 PROCEDURE — 93306 TTE W/DOPPLER COMPLETE: CPT

## 2022-12-14 PROCEDURE — 85027 COMPLETE CBC AUTOMATED: CPT | Performed by: INTERNAL MEDICINE

## 2022-12-14 PROCEDURE — 80048 BASIC METABOLIC PNL TOTAL CA: CPT | Performed by: INTERNAL MEDICINE

## 2022-12-14 RX ORDER — MELATONIN
2000 DAILY
Status: DISCONTINUED | OUTPATIENT
Start: 2022-12-14 | End: 2022-12-14 | Stop reason: HOSPADM

## 2022-12-14 RX ADMIN — CHLORHEXIDINE GLUCONATE 15 ML: 1.2 SOLUTION ORAL at 10:03

## 2022-12-14 RX ADMIN — MUPIROCIN 1 APPLICATION: 20 OINTMENT TOPICAL at 10:03

## 2022-12-14 RX ADMIN — DULOXETINE HYDROCHLORIDE 30 MG: 30 CAPSULE, DELAYED RELEASE ORAL at 10:03

## 2022-12-14 RX ADMIN — CELECOXIB 200 MG: 200 CAPSULE ORAL at 10:03

## 2022-12-14 RX ADMIN — CLOPIDOGREL 75 MG: 75 TABLET, FILM COATED ORAL at 10:03

## 2022-12-14 NOTE — PLAN OF CARE
Goal Outcome Evaluation:   Boost Plus TID to provide additional kcals/protein to promote weight gain.  Provided pt with Boost coupons to use after d/c back to assisted living facility.

## 2022-12-14 NOTE — PROGRESS NOTES
Patient Care Team:  Jame Chamberlain MD as PCP - General (Family Medicine)  Wm Schmidt MD as Consulting Physician (Urology)    Chief Complaint: Follow-up severe degenerative aortic valve stenosis, transcatheter aortic valve replacement    Interval History:   Doing well this morning.  No complaints.  Resting comfortably.    Objective   Vital Signs  Temp:  [93.9 °F (34.4 °C)-98.4 °F (36.9 °C)] 97.4 °F (36.3 °C)  Heart Rate:  [58-94] 66  Resp:  [17-18] 18  BP: ()/(51-80) 107/57  Arterial Line BP: ()/(40-83) 94/46    Intake/Output Summary (Last 24 hours) at 12/14/2022 0913  Last data filed at 12/14/2022 0031  Gross per 24 hour   Intake 240 ml   Output 200 ml   Net 40 ml     Flowsheet Rows    Flowsheet Row First Filed Value   Admission Height 180.3 cm (71\") Documented at 12/13/2022 0530   Admission Weight 64 kg (141 lb 1.5 oz) Documented at 12/13/2022 0530          Temp:  [93.9 °F (34.4 °C)-98.4 °F (36.9 °C)] 97.4 °F (36.3 °C)  Heart Rate:  [58-94] 66  Resp:  [17-18] 18  BP: ()/(51-80) 107/57  Arterial Line BP: ()/(40-83) 94/46    Intake/Output Summary (Last 24 hours) at 12/14/2022 0913  Last data filed at 12/14/2022 0031  Gross per 24 hour   Intake 240 ml   Output 200 ml   Net 40 ml     Flowsheet Rows    Flowsheet Row First Filed Value   Admission Height 180.3 cm (71\") Documented at 12/13/2022 0530   Admission Weight 64 kg (141 lb 1.5 oz) Documented at 12/13/2022 0530          General Appearance:    Alert, cooperative, in no acute distress   Head:    Normocephalic, without obvious abnormality, atraumatic       Neck/Lymph   No adenopathy, supple, no thyromegaly, no carotid bruit, no    JVD   Lungs:     Clear to auscultation bilaterally, no wheezes, rales, or     rhonchi    Cardiac:    Normal rate, regular rhythm, no murmur, no rub, no gallop   Chest Wall:    No abnormalities observed   GI:     Normal bowel sounds, soft, nontender, nondistended,            no rebound tenderness    Extremities:   No cyanosis, clubbing, or edema   Circulatory/Peripheral Vascular :   Pulses palpable and equal bilaterally   Integumentary:   No bleeding or rash. Normal temperature                atorvastatin, 20 mg, Oral, Nightly  celecoxib, 200 mg, Oral, Daily  chlorhexidine, 15 mL, Mouth/Throat, BID  clopidogrel, 75 mg, Oral, Daily  DULoxetine, 30 mg, Oral, Daily  mupirocin, , Nasal, BID  Vitamin D, 2,000 Units, Oral, Daily        lactated ringers, 9 mL/hr, Last Rate: 9 mL/hr (12/13/22 0607)        Results Review:    Results from last 7 days   Lab Units 12/14/22  0324   SODIUM mmol/L 140   POTASSIUM mmol/L 4.0   CHLORIDE mmol/L 107   CO2 mmol/L 26.0   BUN mg/dL 16   CREATININE mg/dL 0.79   GLUCOSE mg/dL 95   CALCIUM mg/dL 8.0*         Results from last 7 days   Lab Units 12/14/22  0324   WBC 10*3/mm3 12.70*   HEMOGLOBIN g/dL 10.3*   HEMATOCRIT % 32.8*   PLATELETS 10*3/mm3 325     Results from last 7 days   Lab Units 12/12/22  0733   INR  0.97   APTT seconds 28.9                 @LABRCNT(bnp)@  I reviewed the patient's new clinical results.  I personally viewed and interpreted the patient's EKG/Telemetry data        Assessment & Plan   1.  Severe degenerative aortic valve stenosis: Status post transcatheter aortic valve replacement  2.  Iron deficiency anemia  3.  History of DVT  4.  Chronic heart failure with preserved ejection    -Currently is doing very well.  No significant issues with groin access.  -Mild anemia.  No indication for transfusion  - No significant issues with pauses or high-grade AV block  -Continue aspirin and Plavix.  -Mean gradient 12.  No paravalvular regurgitation.  Dimensionless index 0.7.  Looks great.  Okay to go home today.

## 2022-12-14 NOTE — PROGRESS NOTES
Enter Query Response Below      Query Response:       Severe malnutrition       If applicable, please update the problem list.      Patient: Durga Canales        : 1935  Account: [id number]           Admit Date:         How to Respond to this query:       a. Click New Note     b. Answer query within the yellow box.                c. Update the Problem List, if applicable.    Dr. Burdick,    Patient seen by registered dietitian on  and she documents \"Patient meets criteria for: Severe Malnutrition\" The findings of nutrition focused physical exam include:  Type - Chronic Disease - Related Malnutrition  Unintentional Weight Loss - Weight loss of 10% in six months  Muscle Loss - Severe loss in regions of temple, clavicle, acromion, scapula, dorsal hand, patella, anterior thigh and posterior calf  Fat Loss - Severe in regions of orbital and upper arm   Treatment: Boost Plus TID    Please clarify if the patient is treated/monitored for one or more of the following:  -Severe malnutrition  -Malnutrition unspecified  -No malnutrition  -Other ____ (specify)  -Unable to determine    By submitting this query, we are merely seeking further clarification of documentation to accurately reflect all conditions that you are monitoring, evaluating, treating or that extend the hospitalization or utilize additional resources of care. Please utilize your independent clinical judgment when addressing the question(s) above.     This query and your response, once completed, will be entered into the legal medical record.    Sincerely,  Monalisa Sousa RN, BSN  [email or web address]  Clinical Documentation Integrity Program

## 2022-12-14 NOTE — CASE MANAGEMENT/SOCIAL WORK
Continued Stay Note  [facility]     Patient Name: Durga Canales  MRN: [id number]  Today's Date: 12/14/2022    Admit Date: 12/13/2022    Plan: Return to [facility] family transport   Discharge Plan     Row Name 12/14/22 1235       Plan    Plan Return to [facility] family transport    Patient/Family in Agreement with Plan yes    Plan Comments D/c order noted. CCP spoke to Luz Elena/Nuvia who states patient is good to return today. Nuvia is requesting RN call report. CCP notified RD we need to call report and provided dc packet. Patient's family will transport. MICHAEL LOZADA               Discharge Codes    No documentation.               Expected Discharge Date and Time     Expected Discharge Date Expected Discharge Time    Dec 14, 2022             MICHAEL Hinton

## 2022-12-14 NOTE — PROGRESS NOTES
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 Pt with 19 lb (12%) wt loss x 6 months with severe fat/muscle wasting noted on NFPE. Pt stated he drinks oral nutritional supplements at his assisted living facility sometimes. Encouraged pt to drink 1-2 Boost Plus /day and provided Boost coupons to pt. Plans for d/c to assisted living facility today.    Severe Malnutrition (Based on ASPEN/AND criteria, pt meets nutrition diagnosis of Severe Malnutrition of Chronic Disease due to inadequate po intake, 19 lb (12%) wt loss x 6 months and severe fat/muscle wasting noted on NFPE.)    Recommend:  1. Boost Plus TID.    Will follow per protocol.    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 INVASIVE LOCATION;  Service: Cardiology;  Laterality: N/A;   • CATARACT EXTRACTION Bilateral    • COLONOSCOPY     • EYE SURGERY     • FINGER SURGERY Left    • JOINT REPLACEMENT     • PERIPHERAL ARTERIAL STENT GRAFT Left 2021    LEG   • AR TOTAL  KNEE ARTHROPLASTY Left 05/26/2016    Procedure: LT TOTAL KNEE ARTHROPLASTY;  Surgeon: Real Benitez MD;  Location: [facility] OR;  Service: Orthopedics   • REPLACEMENT TOTAL KNEE Right    • TONSILLECTOMY          Encounter Information        Nutrition History:  Pt stated recent unintentional wt loss over the past 6 months due to Crohn's flare when he lost 19 lbs. Pt with 19 lb (12%) wt loss x 6 months with severe fat/muscle wasting noted on NFPE. Pt stated he drinks oral nutritional supplements at his assisted living facility sometimes. Encouraged pt to drink 1-2 Boost Plus /day.    Food Preferences:    Supplements:    Factors Affecting Intake: altered GI function, decreased appetite     Anthropometrics        Current Height  Current Weight  BMI kg/m2 Height: 180.3 cm (71\")  Weight: 64 kg (141 lb) (12/14/22 0919)  Body mass index is 19.67 kg/m².   Adjusted BMI (if applicable)        Admission Weight Weight: 64 kg (141 lb)       Ideal Body Weight (IBW) 166 lb   Adjusted IBW (if applicable)        Usual Body Weight (UBW) 160 lb   Weight Change/Trend Loss, Amount/Timeframe: 19 lb (12%) wt loss x 6 months       Weight History Wt Readings from Last 30 Encounters:   12/14/22 0919 64 kg (141 lb)   12/13/22 1239 64 kg (141 lb)   12/13/22 0530 64 kg (141 lb 1.5 oz)   12/12/22 0722 64 kg (141 lb)   12/06/22 1101 64.4 kg (142 lb)   11/15/22 1216 63.5 kg (140 lb)   11/10/22 1538 73.5 kg (162 lb)   05/26/16 0552 73.7 kg (162 lb 8 oz)   05/12/16 1040 73.7 kg (162 lb 6 oz)           --  Estimated/Assessed Needs       Energy Requirements    Height for Calculation  Height: 180.3 cm (71\")   Weight for Calculation Weight: 64 kg (141 lb)   Method for Estimation  30 kcal/kg   EST Needs (kcal/day) 1920 kcals       Protein Requirements    Weight for Calculation Weight: 64 kg (141 lb)   EST Protein Needs (g/kg) 1.2 - 1.5 gm/kg   EST Daily Needs (g/day) 77-96 g       Fluid Requirements     Method for Estimation 30 mL/kg    Estimated  Needs (mL/day) 1920 ml     Tests/Procedures        Tests/Procedures Other: s/p transcatheter aortic valve replacement (12/13)     Labs       Pertinent Labs    Results from last 7 days   Lab Units 12/14/22  0324 12/12/22  0728   SODIUM mmol/L 140 141   POTASSIUM mmol/L 4.0 4.0   CHLORIDE mmol/L 107 105   CO2 mmol/L 26.0 27.7   BUN mg/dL 16 15   CREATININE mg/dL 0.79 0.78   CALCIUM mg/dL 8.0* 9.0   BILIRUBIN mg/dL  --  0.3   ALK PHOS U/L  --  99   ALT (SGPT) U/L  --  7   AST (SGOT) U/L  --  10   GLUCOSE mg/dL 95 93     Results from last 7 days   Lab Units 12/14/22  0324 12/12/22  0733 12/12/22  0728   HEMOGLOBIN g/dL 10.3*   < >  --    HEMOGLOBIN, POC   --    < >  --    HEMATOCRIT % 32.8*   < >  --    HEMATOCRIT POC   --    < >  --    WBC 10*3/mm3 12.70*   < >  --    ALBUMIN g/dL  --   --  3.60    < > = values in this interval not displayed.     Results from last 7 days   Lab Units 12/14/22  0324 12/12/22  0733   INR   --  0.97   APTT seconds  --  28.9   PLATELETS 10*3/mm3 325 422     COVID19   Date Value Ref Range Status   12/12/2022 Not Detected Not Detected - Ref. Range Final     Lab Results   Component Value Date    HGBA1C 5.40 12/12/2022          Medications           Scheduled Medications atorvastatin, 20 mg, Oral, Nightly  celecoxib, 200 mg, Oral, Daily  chlorhexidine, 15 mL, Mouth/Throat, BID  cholecalciferol, 2,000 Units, Oral, Daily  clopidogrel, 75 mg, Oral, Daily  DULoxetine, 30 mg, Oral, Daily  mupirocin, , Nasal, BID       Infusions lactated ringers, 9 mL/hr, Last Rate: 9 mL/hr (12/13/22 0607)       PRN Medications •  acetaminophen  •  HYDROcodone-acetaminophen  •  ondansetron **OR** ondansetron     Physical Findings          Physical Appearance alert, oriented, room air, underweight   Oral/Mouth Cavity WNL   Edema  no edema   Gastrointestinal non-distended , normoactive, last bowel movement:12/11   Skin  skin intact, surgical incision   Tubes/Drains none   NFPE Consented to exam, See Malnutrition  Severity Assessment, Date Completed: 12/14   --  Malnutrition Severity Assessment      Patient meets criteria for : Severe Malnutrition (Based on ASPEN/AND criteria, pt meets nutrition diagnosis of Severe Malnutrition of Chronic Disease due to inadequate po intake, 19 lb (12%) wt loss x 6 months and severe fat/muscle wasting noted on NFPE.)  Malnutrition Type (last 8 hours)     Malnutrition Severity Assessment     Row Name 12/14/22 1230       Malnutrition Severity Assessment    Malnutrition Type Chronic Disease - Related Malnutrition    Row Name 12/14/22 1230       Insufficient Energy Intake     Insufficient Energy Intake Findings Severe    Insufficient Energy Intake  <75% of est. energy requirement for > or equal to 3 months    Row Name 12/14/22 1230       Unintentional Weight Loss     Unintentional Weight Loss Findings Severe    Unintentional Weight Loss  Weight loss of 10% in six months  19 lb (12%) wt loss x 6 months    Row Name 12/14/22 1230       Muscle Loss    Loss of Muscle Mass Findings Severe    Restorationist Region Severe - deep hollowing/scooping, lack of muscle to touch, facial bones well defined    Clavicle Bone Region Severe - protruding prominent bone    Acromion Bone Region Severe - squared shoulders, bones, and acromion process protrusion prominent    Scapular Bone Region Severe - prominent bones, depressions easily visible between ribs, scapula, spine, shoulders    Dorsal Hand Region Severe - prominent depression    Patellar Region Severe - prominent bone, square looking, very little muscle definition    Anterior Thigh Region Severe - line/depression along thigh, obviously thin    Posterior Calf Region Severe - thin with very little definition/firmness    Row Name 12/14/22 1230       Fat Loss    Subcutaneous Fat Loss Findings Severe    Orbital Region  Severe - pronounced hollowness/depression, dark circles, loose saggy skin    Upper Arm Region Severe - mostly skin, very little space between folds,  fingers touch    Row Name 12/14/22 1230       Criteria Met (Must meet criteria for severity in at least 2 of these categories: M Wasting, Fat Loss, Fluid, Secondary Signs, Wt. Status, Intake)    Patient meets criteria for  Severe Malnutrition  Based on ASPEN/AND criteria, pt meets nutrition diagnosis of Severe Malnutrition of Chronic Disease due to inadequate po intake, 19 lb (12%) wt loss x 6 months and severe fat/muscle wasting noted on NFPE.                   Current Nutrition Orders & Evaluation of Intake       Oral Nutrition     Food Allergies NKFA   Current PO Diet Diet: Cardiac Diets; Healthy Heart (2-3 Na+); Texture: Regular Texture (IDDSI 7); Fluid Consistency: Thin (IDDSI 0)   Supplement n/a   PO Evaluation     % PO Intake 75%    # of Days Evaluated 1   --  PES STATEMENT / NUTRITION DIAGNOSIS      Nutrition Dx Problem  Problem: Malnutrition  Etiology: Medical Diagnosis and Factors Affecting Nutrition Crohn's Disease  Signs/Symptoms: NFPE Results, BMI and Unintended Weight Change 19 lb (12%) wt loss x 6 months    Comment:    --  NUTRITION INTERVENTION / PLAN OF CARE      Intervention Goal(s) Maintain nutrition status, Reduce/improve symptoms, Meet estimated needs, Disease management/therapy, Tolerate PO , Maintain intake and Appropriate weight gain         RD Intervention/Action Encourage intake, Follow Tx Progress, Care plan reviewed and Recommend/ordered:          Prescription/Orders:       PO Diet       Supplements Boost Plus TID      Snacks       Enteral Nutrition       Parenteral Nutrition    New Prescription Ordered? Yes   --      Monitor/Evaluation Per protocol   Discharge Plan/Needs No discharge needs identified at this time   Education Other:Provided education on oral nutritional supplements and adequate protein intake.   --    RD to follow per protocol.      Electronically signed by:  Rebeka Wang RD  12/14/22 12:30 EST

## 2022-12-14 NOTE — DISCHARGE SUMMARY
Date of Admission:   Date of Discharge:  12/14/2022    Discharge Diagnosis:    1.  Severe degenerative aortic valve stenosis: Status post transcatheter aortic valve replacement  2.  Iron deficiency anemia  3.  History of DVT  4.  Chronic heart failure with preserved ejection  Presenting Problem/History of Present Illness  Nonrheumatic aortic valve stenosis [I35.0]     Hospital Course  Patient is a 87 y.o. male presented for scheduled TAVR procedure on 12/13 by Dr. Crews and Dr. Burdick.  Post operatively he did well. Post operative echo revealed a well functioning valve mean gradient 12.  He was deemed ready for discharge home.  Follow up appointments as below.    Procedures Performed  Procedure(s):  TTE TRANSFEMORAL TRANSCATHETER AORTIC VALVE REPLACEMENT PERCUTANEOUS APPROACH  Transfemoral Transcatheter Aortic Valve Replacement with intraoperative TTE and possible open surgical rescue       Consults:   Consults     No orders found for last 30 day(s).          Pertinent Test Results:    Lab Results   Component Value Date    WBC 12.70 (H) 12/14/2022    HGB 10.3 (L) 12/14/2022    HCT 32.8 (L) 12/14/2022    MCV 92.4 12/14/2022     12/14/2022      Lab Results   Component Value Date    GLUCOSE 95 12/14/2022    CALCIUM 8.0 (L) 12/14/2022     12/14/2022    K 4.0 12/14/2022    CO2 26.0 12/14/2022     12/14/2022    BUN 16 12/14/2022    CREATININE 0.79 12/14/2022    EGFRIFNONA 91 05/29/2016    BCR 20.3 12/14/2022    ANIONGAP 7.0 12/14/2022     Lab Results   Component Value Date    INR 0.97 12/12/2022    PROTIME 12.9 12/12/2022         Condition on Discharge:  good    Vital Signs  Temp:  [97.4 °F (36.3 °C)-98.4 °F (36.9 °C)] 97.9 °F (36.6 °C)  Heart Rate:  [62-94] 65  Resp:  [17-18] 18  BP: ()/(48-79) 91/48      Discharge Disposition  Home or Self Care    Discharge Medications     Discharge Medications      Continue These Medications      Instructions Start Date   atorvastatin 20 MG  tablet  Commonly known as: LIPITOR   20 mg, Oral, Nightly      celecoxib 200 MG capsule  Commonly known as: CeleBREX   200 mg, Oral, Daily      clopidogrel 75 MG tablet  Commonly known as: PLAVIX   75 mg, Oral, Daily, HOLD PER MD DAWN      docusate sodium 100 MG capsule   100 mg, Oral, 2 Times Daily      DULoxetine 30 MG capsule  Commonly known as: CYMBALTA   30 mg, Oral, Daily, with food      Entyvio 300 MG injection  Generic drug: vedolizumab   300 mg, Intravenous, Every 30 Days      ferrous sulfate 140 (45 Fe) MG tablet controlled-release tablet   140 mg, Oral, Daily With Breakfast      Vitamin D 50 MCG (2000 UT) capsule   2,000 Units, Oral, Daily         Stop These Medications    chlorhexidine 0.12 % solution  Commonly known as: PERIDEX     mupirocin 2 % nasal ointment  Commonly known as: BACTROBAN            Discharge Diet:     Activity at Discharge:   Activity Instructions     Activity as Tolerated      Measure Weight      Daily weight, notify if over 3 lbs in one day          Follow-up Appointments  Future Appointments   Date Time Provider Department Center   12/20/2022  2:30 PM Mireya Park APRN [facility]   1/19/2023  9:15 AM [facility] E[facility]   1/19/2023 10:00 AM Bran Burdick MD [facility]   10/23/2023  2:45 PM [facility] ECHO/VAS RM 1  [facility] E[facility]   10/23/2023  3:40 PM Bran Burdick MD [facility]     Additional Instructions for the Follow-ups that You Need to Schedule     Ambulatory Referral to Cardiac Rehab   As directed      Discharge Follow-up with Specialty: Dr. Burdick in 1 month; 1 Month   As directed      Specialty: Dr. Burdick in 1 month    Follow Up: 1 Month         Discharge Follow-up with Specified Provider: cardiology BRIGHT; 1 Week   As directed      To: cardiology BRIGHT    Follow Up: 1 Week               Test Results Pending at Discharge       BRIGHT Aguillon  12/14/22  12:00 EST

## 2022-12-14 NOTE — PLAN OF CARE
Goal Outcome Evaluation:     Problem: Adult Inpatient Plan of Care  Goal: Plan of Care Review  Outcome: Met  Goal: Patient-Specific Goal (Individualized)  Outcome: Met  Goal: Absence of Hospital-Acquired Illness or Injury  Outcome: Met  Intervention: Identify and Manage Fall Risk  Recent Flowsheet Documentation  Taken 12/14/2022 1000 by Loli Weiss RN  Safety Promotion/Fall Prevention:   safety round/check completed   nonskid shoes/slippers when out of bed   lighting adjusted   fall prevention program maintained   assistive device/personal items within reach   activity supervised  Taken 12/14/2022 0808 by Loli Weiss RN  Safety Promotion/Fall Prevention: patient off unit  Intervention: Prevent Skin Injury  Recent Flowsheet Documentation  Taken 12/14/2022 1000 by Loli Weiss RN  Body Position: position changed independently  Intervention: Prevent and Manage VTE (Venous Thromboembolism) Risk  Recent Flowsheet Documentation  Taken 12/14/2022 1000 by Loli Weiss RN  Activity Management: activity adjusted per tolerance  Range of Motion: active ROM (range of motion) encouraged  Intervention: Prevent Infection  Recent Flowsheet Documentation  Taken 12/14/2022 1000 by Loli Weiss RN  Infection Prevention:   environmental surveillance performed   personal protective equipment utilized   hand hygiene promoted   rest/sleep promoted   single patient room provided   visitors restricted/screened  Goal: Optimal Comfort and Wellbeing  Outcome: Met  Intervention: Provide Person-Centered Care  Recent Flowsheet Documentation  Taken 12/14/2022 1000 by Loli Weiss RN  Trust Relationship/Rapport:   care explained   choices provided   thoughts/feelings acknowledged  Goal: Readiness for Transition of Care  Outcome: Met     Problem: Adjustment to Illness (Heart Failure)  Goal: Optimal Coping  Outcome: Met  Intervention: Support Psychosocial Response  Recent Flowsheet Documentation  Taken 12/14/2022 1000 by Isela  SONNY Ennis  Family/Support System Care:   self-care encouraged   support provided     Problem: Cardiac Output Decreased (Heart Failure)  Goal: Optimal Cardiac Output  Outcome: Met     Problem: Dysrhythmia (Heart Failure)  Goal: Stable Heart Rate and Rhythm  Outcome: Met     Problem: Fluid Imbalance (Heart Failure)  Goal: Fluid Balance  Outcome: Met     Problem: Functional Ability Impaired (Heart Failure)  Goal: Optimal Functional Ability  Outcome: Met  Intervention: Optimize Functional Ability  Recent Flowsheet Documentation  Taken 12/14/2022 1000 by Loli Weiss RN  Activity Management: activity adjusted per tolerance     Problem: Oral Intake Inadequate (Heart Failure)  Goal: Optimal Nutrition Intake  Outcome: Met     Problem: Respiratory Compromise (Heart Failure)  Goal: Effective Oxygenation and Ventilation  Outcome: Met  Intervention: Promote Airway Secretion Clearance  Recent Flowsheet Documentation  Taken 12/14/2022 1000 by Loli Weiss RN  Cough And Deep Breathing: done independently per patient     Problem: Sleep Disordered Breathing (Heart Failure)  Goal: Effective Breathing Pattern During Sleep  Outcome: Met     Problem: Asthma Comorbidity  Goal: Maintenance of Asthma Control  Outcome: Met  Intervention: Maintain Asthma Symptom Control  Recent Flowsheet Documentation  Taken 12/14/2022 1000 by Loli Weiss RN  Medication Review/Management: medications reviewed     Problem: Behavioral Health Comorbidity  Goal: Maintenance of Behavioral Health Symptom Control  Outcome: Met  Intervention: Maintain Behavioral Health Symptom Control  Recent Flowsheet Documentation  Taken 12/14/2022 1000 by Loli Weiss RN  Medication Review/Management: medications reviewed     Problem: COPD (Chronic Obstructive Pulmonary Disease) Comorbidity  Goal: Maintenance of COPD Symptom Control  Outcome: Met  Intervention: Maintain COPD-Symptom Control  Recent Flowsheet Documentation  Taken 12/14/2022 1000 by Loli Weiss  RN  Medication Review/Management: medications reviewed     Problem: Diabetes Comorbidity  Goal: Blood Glucose Level Within Targeted Range  Outcome: Met     Problem: Heart Failure Comorbidity  Goal: Maintenance of Heart Failure Symptom Control  Outcome: Met  Intervention: Maintain Heart Failure-Management  Recent Flowsheet Documentation  Taken 12/14/2022 1000 by Loli Weiss RN  Medication Review/Management: medications reviewed     Problem: Hypertension Comorbidity  Goal: Blood Pressure in Desired Range  Outcome: Met  Intervention: Maintain Blood Pressure Management  Recent Flowsheet Documentation  Taken 12/14/2022 1000 by Loli Weiss RN  Medication Review/Management: medications reviewed     Problem: Obstructive Sleep Apnea Risk or Actual Comorbidity Management  Goal: Unobstructed Breathing During Sleep  Outcome: Met     Problem: Osteoarthritis Comorbidity  Goal: Maintenance of Osteoarthritis Symptom Control  Outcome: Met  Intervention: Maintain Osteoarthritis Symptom Control  Recent Flowsheet Documentation  Taken 12/14/2022 1000 by Loli Weiss RN  Activity Management: activity adjusted per tolerance  Medication Review/Management: medications reviewed     Problem: Pain Chronic (Persistent) (Comorbidity Management)  Goal: Acceptable Pain Control and Functional Ability  Outcome: Met  Intervention: Manage Persistent Pain  Recent Flowsheet Documentation  Taken 12/14/2022 1000 by Loli Weiss RN  Medication Review/Management: medications reviewed  Intervention: Optimize Psychosocial Wellbeing  Recent Flowsheet Documentation  Taken 12/14/2022 1000 by Loli Weiss RN  Diversional Activities: television  Family/Support System Care:   self-care encouraged   support provided     Problem: Seizure Disorder Comorbidity  Goal: Maintenance of Seizure Control  Outcome: Met

## 2022-12-15 NOTE — CASE MANAGEMENT/SOCIAL WORK
Case Management Discharge Note      Final Note: The patient d/c to Assisted Living at [facility] and was transported by family. MICHAEL HERNANDEZ    Provided Post Acute Provider List?: N/A  N/A Provider List Comment: Plan is to return to [facility]  Provided Post Acute Provider Quality & Resource List?: N/A    Selected Continued Care - Discharged on 12/14/2022 Admission date: 12/13/2022 - Discharge disposition: Home or Self Care    Destination Coordination complete.    Service Provider Selected Services Address Phone Fax Patient Preferred    [facility] [address] [facility] [address] [address] [phone] --       Internal Comment last updated by Rocio Knight RN 12/13/2022 1942    Assisted Living                     Durable Medical Equipment    No services have been selected for the patient.              Dialysis/Infusion    No services have been selected for the patient.              Home Medical Care    No services have been selected for the patient.              Therapy    No services have been selected for the patient.              Community Resources    No services have been selected for the patient.              Community & DME    No services have been selected for the patient.                  Transportation Services  Private: Car    Final Discharge Disposition Code: 01 - home or self-care

## 2022-12-20 ENCOUNTER — OFFICE VISIT (OUTPATIENT)
Dept: CARDIOLOGY | Facility: CLINIC | Age: 87
End: 2022-12-20

## 2022-12-20 VITALS
BODY MASS INDEX: 20.16 KG/M2 | WEIGHT: 144 LBS | HEART RATE: 70 BPM | DIASTOLIC BLOOD PRESSURE: 74 MMHG | SYSTOLIC BLOOD PRESSURE: 132 MMHG | HEIGHT: 71 IN

## 2022-12-20 DIAGNOSIS — I35.0 NONRHEUMATIC AORTIC VALVE STENOSIS: Primary | ICD-10-CM

## 2022-12-20 PROCEDURE — 99213 OFFICE O/P EST LOW 20 MIN: CPT | Performed by: NURSE PRACTITIONER

## 2022-12-20 PROCEDURE — 93000 ELECTROCARDIOGRAM COMPLETE: CPT | Performed by: NURSE PRACTITIONER

## 2022-12-20 NOTE — PROGRESS NOTES
Date of Office Visit: 2022  Encounter Provider: BRIGHT Reid  Place of Service: [facility] CARDIOLOGY  Patient Name: Durga Canales  :1935    No chief complaint on file.  : one week TAVR follow up    HPI: Durga Canales is a 87 y.o. male who is a patient of Dr. Burdick.  He is new to me today and presents for 1 week office follow-up post TAVR.  Patient has history of severe degenerative aortic stenosis, severe malnutrition, Crohn's disease, deficiency anemia and chronic heart failure with preserved ejection fraction. He previously underwent evaluation in the [facility] system with a TTE 2022 with evidence of at least moderate to severe aortic valve stenosis if not severe.  He initially presented to Dr. Burdick on 11/10/2022 with complaints of more fatigue and worsening shortness of breath with minimal levels of activity over the last 6 months.  Due to patient being symptomatic and his mean gradient and peak velocity near severe range, he underwent limited echo and left right heart cath to further evaluate valvular heart disease.      Patient underwent transcatheter aortic valve replacement with a 23 mm Almazan Noe Ultra TAVR valve on 2022.  Post echocardiogram showed prosthetic valve in place with normal peak and mean gradients.      She presents today doing very well.  He has noticed a big difference in his breathing.  States that he still has the fatigue, but feels better.  Pressures well controlled.  EKG stable.  Bilateral femoral sites with small hardened area right greater than left.  No complications.  Patient would like to start working on exercise bike however I have asked him to wait until he follows up with Dr. Burdick.  He has no complaints of chest pain, pressure, shortness of breath, lightheadedness or lower extremity edema.     Previous testing and notes have been reviewed by me.   Past Medical History:   Diagnosis Date   • Aortic valve  stenosis    • Arthritis    • Crohn disease (HCC)    • H/O: pneumonia    • High cholesterol    • Iron deficiency anemia    • Knee pain, bilateral        Past Surgical History:   Procedure Laterality Date   • AORTIC VALVE REPAIR/REPLACEMENT N/A 2022    Procedure: TTE TRANSFEMORAL TRANSCATHETER AORTIC VALVE REPLACEMENT PERCUTANEOUS APPROACH;  Surgeon: Anuel Crews MD;  Location: [facility];  Service: Cardiothoracic;  Laterality: N/A;   • AORTIC VALVE REPAIR/REPLACEMENT N/A 2022    Procedure: Transfemoral Transcatheter Aortic Valve Replacement with intraoperative TTE and possible open surgical rescue;  Surgeon: Bran Burdick MD;  Location: [facility];  Service: Cardiovascular;  Laterality: N/A;   • CARDIAC CATHETERIZATION N/A 11/15/2022    Procedure: Right and Left Heart Cath;  Surgeon: Bran Burdick MD;  Location: [facility] CATH INVASIVE LOCATION;  Service: Cardiology;  Laterality: N/A;   • CATARACT EXTRACTION Bilateral    • COLONOSCOPY     • EYE SURGERY     • FINGER SURGERY Left    • JOINT REPLACEMENT     • PERIPHERAL ARTERIAL STENT GRAFT Left     LEG   • WI TOTAL KNEE ARTHROPLASTY Left 2016    Procedure: LT TOTAL KNEE ARTHROPLASTY;  Surgeon: Real Benitez MD;  Location: [facility] OR;  Service: Orthopedics   • REPLACEMENT TOTAL KNEE Right    • TONSILLECTOMY         Social History     Socioeconomic History   • Marital status:    Tobacco Use   • Smoking status: Former     Packs/day: 1.00     Years: 15.00     Pack years: 15.00     Types: Cigarettes     Quit date:      Years since quittin.0   • Smokeless tobacco: Never   Vaping Use   • Vaping Use: Never used   Substance and Sexual Activity   • Alcohol use: Not Currently   • Drug use: No   • Sexual activity: Defer       Family History   Problem Relation Age of Onset   • Brain cancer Mother 92   • Heart attack Father 92   • Malig Hyperthermia Neg Hx        Review of Systems   Constitutional: Negative.  "  HENT: Negative.    Eyes: Negative.    Cardiovascular: Negative.    Respiratory: Negative.    Endocrine: Negative.    Hematologic/Lymphatic:        Plavix   Skin: Negative.    Musculoskeletal: Negative.    Gastrointestinal: Negative.    Genitourinary: Negative.    Neurological: Negative.    Psychiatric/Behavioral: Negative.    Allergic/Immunologic: Negative.        No Known Allergies      Current Outpatient Medications:   •  atorvastatin (LIPITOR) 20 MG tablet, Take 20 mg by mouth every night., Disp: , Rfl:   •  celecoxib (CeleBREX) 200 MG capsule, Take 1 capsule by mouth Daily., Disp: , Rfl:   •  Cholecalciferol (Vitamin D) 50 MCG (2000 UT) capsule, Take 1 capsule by mouth Daily., Disp: , Rfl:   •  clopidogrel (PLAVIX) 75 MG tablet, Take 75 mg by mouth Daily. HOLD PER MD INSTR, Disp: , Rfl:   •  docusate sodium 100 MG capsule, Take 1 capsule by mouth 2 (Two) Times a Day., Disp: , Rfl:   •  DULoxetine (CYMBALTA) 30 MG capsule, Take 1 capsule by mouth Daily. with food, Disp: , Rfl:   •  ferrous sulfate 140 (45 Fe) MG tablet controlled-release tablet, Take 1 tablet by mouth Daily With Breakfast., Disp: , Rfl:   •  vedolizumab (Entyvio) 300 MG injection, Infuse 5 mL into a venous catheter Every 30 (Thirty) Days., Disp: , Rfl:       Objective:     Vitals:    12/20/22 1442   BP: 132/74   Pulse: 70   Weight: 65.3 kg (144 lb)   Height: 180.3 cm (70.98\")     Body mass index is 20.09 kg/m².      2D Echocardiogram 12/14/2022 (post TAVR):  •  Left ventricular systolic function is normal. Calculated left ventricular EF = 62.5%  •  Left ventricular wall thickness is consistent with mild concentric hypertrophy. Sigmoid-shaped ventricular septum is present.  •  Left ventricular diastolic function is consistent with (grade II w/high LAP) pseudonormalization.  •  Estimated right ventricular systolic pressure from tricuspid regurgitation is normal (<35 mmHg).  •  Left atrial volume is mildly increased. Saline test results are " positive. Late bubbles noted, likely PFO  •  No aortic valve regurgitation is present. There is a 23 mm TAVR valve present. The aortic valve peak and mean gradients are within defined limits. The prosthetic aortic valve is normal.     Right / Left Heart Cath 11/15/2022:  AO: 83/55     RA: 12/7/6  RV: 27/8  PA: 25/10/15  Wedge: 13/10/10    Saturations  PA 74%  SVC 74%  AO 94%      Cardiac index 3.6 L/min/m²    1. Left main: Normal  2. LAD: Diffuse 50% mid vessel stenosis.  3. LCX: Luminal irregularities OM2  4. RCA: Discrete 50 to 60% mid vessel stenosis  5.  Normal left and right-sided filling pressures  6.  Normal cardiac index       PHYSICAL EXAM:    Constitutional:       Appearance: Healthy appearance. Not in distress.   Neck:      Vascular: No JVR. JVD normal.   Pulmonary:      Effort: Pulmonary effort is normal.      Breath sounds: Normal breath sounds. No wheezing. No rhonchi. No rales.   Chest:      Chest wall: Not tender to palpatation.   Cardiovascular:      PMI at left midclavicular line. Normal rate. Regular rhythm. Normal S1. Normal S2.      Murmurs: There is a systolic murmur.      No gallop. No click. No rub.   Pulses:     Intact distal pulses.   Edema:     Peripheral edema absent.   Abdominal:      General: Bowel sounds are normal.      Palpations: Abdomen is soft.      Tenderness: There is no abdominal tenderness.   Musculoskeletal: Normal range of motion.         General: No tenderness. Skin:     General: Skin is warm and dry.      Comments: Lateral groin sites with small hardened areas.  Right greater than left.  No complications   Neurological:      General: No focal deficit present.      Mental Status: Alert and oriented to person, place and time.           ECG 12 Lead    Date/Time: 12/20/2022 4:11 PM  Performed by: Mireya Park APRN  Authorized by: Mireya Park APRN   Comparison: compared with previous ECG from 12/12/2022  Rhythm: sinus rhythm  Ectopy: atrial premature  contractions  Rate: normal  BPM: 70  T Waves: T waves normal                Assessment:       Diagnosis Plan   1. Nonrheumatic aortic valve stenosis          No orders of the defined types were placed in this encounter.         Plan:       1.  Severe degenerative aortic stenosis: Status post TAVR.  Well-seated prosthetic valve and normal gradients.  Improved breathing           Your medication list          Accurate as of December 20, 2022  4:09 PM. If you have any questions, ask your nurse or doctor.            CONTINUE taking these medications      Instructions Last Dose Given Next Dose Due   atorvastatin 20 MG tablet  Commonly known as: LIPITOR      Take 20 mg by mouth every night.       celecoxib 200 MG capsule  Commonly known as: CeleBREX      Take 1 capsule by mouth Daily.       clopidogrel 75 MG tablet  Commonly known as: PLAVIX      Take 75 mg by mouth Daily. HOLD PER MD DAWN       docusate sodium 100 MG capsule      Take 1 capsule by mouth 2 (Two) Times a Day.       DULoxetine 30 MG capsule  Commonly known as: CYMBALTA      Take 1 capsule by mouth Daily. with food       Entyvio 300 MG injection  Generic drug: vedolizumab      Infuse 5 mL into a venous catheter Every 30 (Thirty) Days.       ferrous sulfate 140 (45 Fe) MG tablet controlled-release tablet      Take 1 tablet by mouth Daily With Breakfast.       Vitamin D 50 MCG (2000 UT) capsule      Take 1 capsule by mouth Daily.                As always, it has been a pleasure to participate in your patient's care.      Sincerely,       BRIGHT Gibson

## 2023-02-15 ENCOUNTER — HOSPITAL ENCOUNTER (OUTPATIENT)
Dept: CARDIOLOGY | Facility: HOSPITAL | Age: 88
Discharge: HOME | End: 2023-02-15
Admitting: INTERNAL MEDICINE
Payer: MEDICARE

## 2023-02-15 ENCOUNTER — OFFICE VISIT (OUTPATIENT)
Dept: CARDIOLOGY | Facility: CLINIC | Age: 88
End: 2023-02-15
Payer: MEDICARE

## 2023-02-15 VITALS
BODY MASS INDEX: 19.18 KG/M2 | HEIGHT: 70 IN | DIASTOLIC BLOOD PRESSURE: 70 MMHG | WEIGHT: 134 LBS | SYSTOLIC BLOOD PRESSURE: 110 MMHG | HEART RATE: 72 BPM

## 2023-02-15 VITALS
WEIGHT: 134 LBS | DIASTOLIC BLOOD PRESSURE: 70 MMHG | HEART RATE: 75 BPM | HEIGHT: 70 IN | BODY MASS INDEX: 19.18 KG/M2 | SYSTOLIC BLOOD PRESSURE: 110 MMHG

## 2023-02-15 DIAGNOSIS — I35.0 NONRHEUMATIC AORTIC VALVE STENOSIS: ICD-10-CM

## 2023-02-15 DIAGNOSIS — R06.09 DOE (DYSPNEA ON EXERTION): ICD-10-CM

## 2023-02-15 DIAGNOSIS — Z95.2 S/P TAVR (TRANSCATHETER AORTIC VALVE REPLACEMENT): Primary | ICD-10-CM

## 2023-02-15 DIAGNOSIS — Z95.2 S/P TAVR (TRANSCATHETER AORTIC VALVE REPLACEMENT): ICD-10-CM

## 2023-02-15 LAB
AORTIC ARCH: 3.1 CM
AORTIC DIMENSIONLESS INDEX: 0.5 (DI)
ASCENDING AORTA: 3.6 CM
BH CV ECHO AV AORTIC VALVE AT ACCEL TIME CALCULATED: 100 MSEC
BH CV ECHO MEAS - AO MAX PG: 30.6 MMHG
BH CV ECHO MEAS - AO MEAN PG: 17.3 MMHG
BH CV ECHO MEAS - AO ROOT DIAM: 2.9 CM
BH CV ECHO MEAS - AO V2 MAX: 276.6 CM/SEC
BH CV ECHO MEAS - AO V2 VTI: 57.4 CM
BH CV ECHO MEAS - AT: 0.1 SEC
BH CV ECHO MEAS - AVA(I,D): 1.71 CM2
BH CV ECHO MEAS - EDV(CUBED): 108.7 ML
BH CV ECHO MEAS - EDV(MOD-SP2): 90 ML
BH CV ECHO MEAS - EDV(MOD-SP4): 96 ML
BH CV ECHO MEAS - EF(MOD-BP): 67.9 %
BH CV ECHO MEAS - EF(MOD-SP2): 70 %
BH CV ECHO MEAS - EF(MOD-SP4): 66.7 %
BH CV ECHO MEAS - ESV(CUBED): 29.5 ML
BH CV ECHO MEAS - ESV(MOD-SP2): 27 ML
BH CV ECHO MEAS - ESV(MOD-SP4): 32 ML
BH CV ECHO MEAS - FS: 35.3 %
BH CV ECHO MEAS - IVS/LVPW: 1.23 CM
BH CV ECHO MEAS - IVSD: 1.3 CM
BH CV ECHO MEAS - LAT PEAK E' VEL: 6.7 CM/SEC
BH CV ECHO MEAS - LV DIASTOLIC VOL/BSA (35-75): 54.5 CM2
BH CV ECHO MEAS - LV MASS(C)D: 212 GRAMS
BH CV ECHO MEAS - LV MAX PG: 5.7 MMHG
BH CV ECHO MEAS - LV MEAN PG: 3.7 MMHG
BH CV ECHO MEAS - LV SYSTOLIC VOL/BSA (12-30): 18.2 CM2
BH CV ECHO MEAS - LV V1 MAX: 119.4 CM/SEC
BH CV ECHO MEAS - LV V1 VTI: 25 CM
BH CV ECHO MEAS - LVIDD: 4.8 CM
BH CV ECHO MEAS - LVIDS: 3.1 CM
BH CV ECHO MEAS - LVOT AREA: 3.9 CM2
BH CV ECHO MEAS - LVOT DIAM: 2.24 CM
BH CV ECHO MEAS - LVPWD: 1.06 CM
BH CV ECHO MEAS - MED PEAK E' VEL: 5 CM/SEC
BH CV ECHO MEAS - MV A DUR: 0.12 SEC
BH CV ECHO MEAS - MV A MAX VEL: 106.5 CM/SEC
BH CV ECHO MEAS - MV DEC SLOPE: 328 CM/SEC2
BH CV ECHO MEAS - MV DEC TIME: 0.23 MSEC
BH CV ECHO MEAS - MV E MAX VEL: 71 CM/SEC
BH CV ECHO MEAS - MV E/A: 0.67
BH CV ECHO MEAS - MV MAX PG: 5.1 MMHG
BH CV ECHO MEAS - MV MEAN PG: 2.28 MMHG
BH CV ECHO MEAS - MV P1/2T: 72.7 MSEC
BH CV ECHO MEAS - MV V2 VTI: 36.3 CM
BH CV ECHO MEAS - MVA(P1/2T): 3 CM2
BH CV ECHO MEAS - MVA(VTI): 2.7 CM2
BH CV ECHO MEAS - PA ACC TIME: 0.08 SEC
BH CV ECHO MEAS - PA PR(ACCEL): 44.1 MMHG
BH CV ECHO MEAS - PA V2 MAX: 124.9 CM/SEC
BH CV ECHO MEAS - PULM A REVS DUR: 0.1 SEC
BH CV ECHO MEAS - PULM A REVS VEL: 29.1 CM/SEC
BH CV ECHO MEAS - PULM DIAS VEL: 37.3 CM/SEC
BH CV ECHO MEAS - PULM S/D: 1.4
BH CV ECHO MEAS - PULM SYS VEL: 52.4 CM/SEC
BH CV ECHO MEAS - QP/QS: 0.57
BH CV ECHO MEAS - RAP SYSTOLE: 3 MMHG
BH CV ECHO MEAS - RV MAX PG: 2.8 MMHG
BH CV ECHO MEAS - RV V1 MAX: 83.1 CM/SEC
BH CV ECHO MEAS - RV V1 VTI: 17.6 CM
BH CV ECHO MEAS - RVOT DIAM: 2.02 CM
BH CV ECHO MEAS - RVSP: 21.2 MMHG
BH CV ECHO MEAS - SI(MOD-SP2): 35.8 ML/M2
BH CV ECHO MEAS - SI(MOD-SP4): 36.4 ML/M2
BH CV ECHO MEAS - SUP REN AO DIAM: 2 CM
BH CV ECHO MEAS - SV(LVOT): 98.1 ML
BH CV ECHO MEAS - SV(MOD-SP2): 63 ML
BH CV ECHO MEAS - SV(MOD-SP4): 64 ML
BH CV ECHO MEAS - SV(RVOT): 56.4 ML
BH CV ECHO MEAS - TAPSE (>1.6): 2.7 CM
BH CV ECHO MEAS - TR MAX PG: 18.2 MMHG
BH CV ECHO MEAS - TR MAX VEL: 213.1 CM/SEC
BH CV ECHO MEASUREMENTS AVERAGE E/E' RATIO: 12.14
BH CV XLRA - RV BASE: 2.8 CM
BH CV XLRA - RV LENGTH: 6.9 CM
BH CV XLRA - RV MID: 2.9 CM
BH CV XLRA - TDI S': 9.8 CM/SEC
LEFT ATRIUM VOLUME INDEX: 39.8 ML/M2
MAXIMAL PREDICTED HEART RATE: 134 BPM
SINUS: 3.1 CM
STJ: 3.5 CM
STRESS TARGET HR: 114 BPM

## 2023-02-15 PROCEDURE — 93000 ELECTROCARDIOGRAM COMPLETE: CPT | Performed by: INTERNAL MEDICINE

## 2023-02-15 PROCEDURE — 93306 TTE W/DOPPLER COMPLETE: CPT

## 2023-02-15 PROCEDURE — 99214 OFFICE O/P EST MOD 30 MIN: CPT | Performed by: INTERNAL MEDICINE

## 2023-02-15 PROCEDURE — 93306 TTE W/DOPPLER COMPLETE: CPT | Performed by: INTERNAL MEDICINE

## 2023-02-15 NOTE — PROGRESS NOTES
Date of Office Visit: 02/15/23  \Encounter Provider: Bran Burdick MD  Place of Service: [facility] CARDIOLOGY  Patient Name: Durga Canales  :1935    Chief Complaint   Patient presents with   • 30 Day Post TAVR   Chronic heart failure with preserved LVEf    HPI:   87 y.o. male with a medical history of severe degenerative aortic stenosis, malnutrition, Crohn's disease, deficiency anemia and chronic heart failure with preserved ejection fraction. He previously underwent evaluation in the [facility] system with a TTE 2022 with evidence of at least moderate to severe aortic valve stenosis if not severe.  He initially presented tome on 11/10/2022 with complaints of more fatigue and worsening shortness of breath with minimal levels of activity over the last 6 months.  Due to patient being symptomatic and his mean gradient and peak velocity near severe range, he underwent limited echo and left right heart cath to further evaluate valvular heart disease.      Patient underwent transcatheter aortic valve replacement with a 23 mm Almazan Noe Ultra TAVR valve on 2022.  Post echocardiogram showed prosthetic valve in place with normal peak and mean gradients.      Since that time he is done very well.  He denies any chest pain or dyspnea.  His blood pressure and heart rate have been well controlled      Previous testing and notes have been reviewed by me.   Past Medical History:   Diagnosis Date   • Aortic valve stenosis    • Arthritis    • Crohn disease (HCC)    • H/O: pneumonia    • High cholesterol    • Iron deficiency anemia    • Knee pain, bilateral        Past Surgical History:   Procedure Laterality Date   • AORTIC VALVE REPAIR/REPLACEMENT N/A 2022    Procedure: TTE TRANSFEMORAL TRANSCATHETER AORTIC VALVE REPLACEMENT PERCUTANEOUS APPROACH;  Surgeon: Anuel Crews MD;  Location: [facility];  Service: Cardiothoracic;  Laterality: N/A;   • AORTIC  VALVE REPAIR/REPLACEMENT N/A 2022    Procedure: Transfemoral Transcatheter Aortic Valve Replacement with intraoperative TTE and possible open surgical rescue;  Surgeon: Bran Burdick MD;  Location: [facility] CVOR;  Service: Cardiovascular;  Laterality: N/A;   • CARDIAC CATHETERIZATION N/A 11/15/2022    Procedure: Right and Left Heart Cath;  Surgeon: Bran Burdick MD;  Location: [facility] CATH INVASIVE LOCATION;  Service: Cardiology;  Laterality: N/A;   • CATARACT EXTRACTION Bilateral    • COLONOSCOPY     • EYE SURGERY     • FINGER SURGERY Left    • JOINT REPLACEMENT     • PERIPHERAL ARTERIAL STENT GRAFT Left     LEG   • IA ARTHRP KNE CONDYLE&PLATU MEDIAL&LAT COMPARTMENTS Left 2016    Procedure: LT TOTAL KNEE ARTHROPLASTY;  Surgeon: Real Benitez MD;  Location: [facility] OR;  Service: Orthopedics   • REPLACEMENT TOTAL KNEE Right    • TONSILLECTOMY         Social History     Socioeconomic History   • Marital status:    Tobacco Use   • Smoking status: Former     Packs/day: 1.00     Years: 15.00     Pack years: 15.00     Types: Cigarettes     Quit date:      Years since quittin.1   • Smokeless tobacco: Never   Vaping Use   • Vaping Use: Never used   Substance and Sexual Activity   • Alcohol use: Not Currently   • Drug use: No   • Sexual activity: Defer       Family History   Problem Relation Age of Onset   • Brain cancer Mother 92   • Heart attack Father 92   • Malig Hyperthermia Neg Hx        Review of Systems   Constitutional: Negative. Negative for fever and malaise/fatigue.   HENT: Negative.  Negative for nosebleeds and sore throat.    Eyes: Negative.  Negative for blurred vision and double vision.   Cardiovascular: Negative.  Negative for chest pain, claudication, palpitations and syncope.   Respiratory: Negative.  Negative for cough, shortness of breath and snoring.    Endocrine: Negative.  Negative for cold intolerance, heat intolerance and polydipsia.  "  Hematologic/Lymphatic:             Skin: Negative.  Negative for itching, poor wound healing and rash.   Musculoskeletal: Negative.  Negative for joint pain, joint swelling, muscle weakness and myalgias.   Gastrointestinal: Negative.  Negative for abdominal pain, melena, nausea and vomiting.   Genitourinary: Negative.    Neurological: Negative.  Negative for light-headedness, loss of balance, seizures, vertigo and weakness.   Psychiatric/Behavioral: Negative.  Negative for altered mental status and depression.   Allergic/Immunologic: Negative.        No Known Allergies      Current Outpatient Medications:   •  atorvastatin (LIPITOR) 20 MG tablet, Take 20 mg by mouth every night., Disp: , Rfl:   •  celecoxib (CeleBREX) 200 MG capsule, Take 1 capsule by mouth Daily., Disp: , Rfl:   •  Cholecalciferol (Vitamin D) 50 MCG (2000 UT) capsule, Take 1 capsule by mouth Daily., Disp: , Rfl:   •  clopidogrel (PLAVIX) 75 MG tablet, Take 75 mg by mouth Daily. HOLD PER MD INSTR, Disp: , Rfl:   •  docusate sodium 100 MG capsule, Take 1 capsule by mouth 2 (Two) Times a Day., Disp: , Rfl:   •  DULoxetine (CYMBALTA) 30 MG capsule, Take 1 capsule by mouth Daily. with food, Disp: , Rfl:   •  ferrous sulfate 140 (45 Fe) MG tablet controlled-release tablet, Take 1 tablet by mouth Daily With Breakfast., Disp: , Rfl:   •  IRON DEXTRAN COMPLEX IJ, Inject  as directed As Needed., Disp: , Rfl:   •  Ustekinumab (STELARA IV), Infuse  into a venous catheter., Disp: , Rfl:       Objective:     Vitals:    02/15/23 1123   BP: 110/70   Pulse: 72   Weight: 60.8 kg (134 lb)   Height: 177.8 cm (70\")     Body mass index is 19.23 kg/m².       PHYSICAL EXAM:    Constitutional:       Appearance: Healthy appearance. Well-developed and not in distress.   Eyes:      General: No scleral icterus.     Conjunctiva/sclera: Conjunctivae normal.   HENT:      Head: Normocephalic and atraumatic.   Neck:      Thyroid: No thyromegaly.      Vascular: Normal carotid " pulses. No carotid bruit, hepatojugular reflux, JVD or JVR. JVD normal.      Trachea: No tracheal deviation.   Pulmonary:      Effort: Pulmonary effort is normal. No respiratory distress.      Breath sounds: Normal breath sounds. No decreased breath sounds. No wheezing. No rhonchi. No rales.   Chest:      Chest wall: Not tender to palpatation.   Cardiovascular:      PMI at left midclavicular line. Normal rate. Regular rhythm. Normal S1. Normal S2.      Murmurs: There is no murmur.      No gallop. No click. No rub.   Pulses:     Intact distal pulses.      Carotid: 2+ bilaterally.     Radial: 2+ bilaterally.     Femoral: 2+ bilaterally.     Dorsalis pedis: 2+ bilaterally.     Posterior tibial: 2+ bilaterally.  Edema:     Peripheral edema absent.   Abdominal:      General: Bowel sounds are normal. There is no distension.      Palpations: Abdomen is soft.      Tenderness: There is no abdominal tenderness.   Musculoskeletal: Normal range of motion.         General: No tenderness or deformity.      Cervical back: Normal range of motion and neck supple. Skin:     General: Skin is warm and dry.      Findings: No erythema or rash.   Neurological:      General: No focal deficit present.      Mental Status: Alert, oriented to person, place, and time and oriented to person, place and time.      Sensory: No sensory deficit.   Psychiatric:         Behavior: Behavior normal.           ECG 12 Lead    Date/Time: 2/15/2023 11:53 AM  Performed by: Bran Burdick MD  Authorized by: Bran Burdick MD   Comparison: compared with previous ECG from 12/20/2022  Comparison to previous ECG: PVCs are new    Rhythm: sinus rhythm  Ectopy: multifocal PVCs  Conduction: non-specific intraventricular conduction delay           12/15/22    Left ventricular systolic function is normal. Calculated left ventricular EF = 62.5%  •  Left ventricular wall thickness is consistent with mild concentric hypertrophy. Sigmoid-shaped ventricular  septum is present.  •  Left ventricular diastolic function is consistent with (grade II w/high LAP) pseudonormalization.  •  Estimated right ventricular systolic pressure from tricuspid regurgitation is normal (<35 mmHg).  •  Left atrial volume is mildly increased. Saline test results are positive. Late bubbles noted, likely PFO  •  No aortic valve regurgitation is present. There is a 23 mm TAVR valve present. The aortic valve peak and mean gradients are within defined limits. The prosthetic aortic valve is normal.      12/14/22  CONCLUSIONS:  Successful deployment of a 23 mm Noe 3 transcatheter aortic heart valve.    Right / Left Heart Cath 11/15/2022:  AO: 83/55  RA: 12/7/6  RV: 27/8  PA: 25/10/15  Wedge: 13/10/10    Saturations  PA 74%  SVC 74%  AO 94%      Cardiac index 3.6 L/min/m²  1. Left main: Normal  2. LAD: Diffuse 50% mid vessel stenosis.  3. LCX: Luminal irregularities OM2  4. RCA: Discrete 50 to 60% mid vessel stenosis  5.  Normal left and right-sided filling pressures  6.  Normal cardiac index         Assessment:     Plan:   87-year-old with a medical history of severe degenerative aortic valve stenosis, Crohn's disease, iron deficiency anemia, chronic heart failure with preserved ejection fraction who presents back after transcatheter aortic valve replacement.  He did well with that and has an excellent functioning valve with only trivial paravalvular regurgitation.  His ejection fraction is preserved.  He currently reports improved dyspnea on exertion and I would put him at [place] Heart Association class II symptoms.    1 severe degenerative aortic valve stenosis: Status post transcatheter aortic valve replacement as above.  - Continue Plavix monotherapy.  Tolerating without any bleeding issues.  - Echocardiogram images and report have been reviewed.  Repeat in 1 year.    2.  Chronic heart failure with preserved ejection fraction: Near Heart Association class II symptoms.  Euvolemic.  No  changes         Your medication list          Accurate as of February 15, 2023 11:53 AM. If you have any questions, ask your nurse or doctor.            CONTINUE taking these medications      Instructions Last Dose Given Next Dose Due   atorvastatin 20 MG tablet  Commonly known as: LIPITOR      Take 20 mg by mouth every night.       celecoxib 200 MG capsule  Commonly known as: CeleBREX      Take 1 capsule by mouth Daily.       clopidogrel 75 MG tablet  Commonly known as: PLAVIX      Take 75 mg by mouth Daily. HOLD PER MD DAWN       docusate sodium 100 MG capsule      Take 1 capsule by mouth 2 (Two) Times a Day.       DULoxetine 30 MG capsule  Commonly known as: CYMBALTA      Take 1 capsule by mouth Daily. with food       ferrous sulfate 140 (45 Fe) MG tablet controlled-release tablet      Take 1 tablet by mouth Daily With Breakfast.       IRON DEXTRAN COMPLEX IJ      Inject  as directed As Needed.       STELARA IV      Infuse  into a venous catheter.       Vitamin D 50 MCG (2000 UT) capsule      Take 1 capsule by mouth Daily.

## 2023-10-10 ENCOUNTER — TELEPHONE (OUTPATIENT)
Dept: CARDIOLOGY | Facility: CLINIC | Age: 88
End: 2023-10-10

## 2023-10-10 NOTE — TELEPHONE ENCOUNTER
Caller: Zac Mcmillan    Relationship to patient: Emergency Contact    Best call back number: [phone]    Type of visit: FOLLOW UP AND ECHO    Requested date: AFTER 11.1.23    If rescheduling, when is the original appointment: 10.23.23     Additional notes: PT'S DAUGHTER IS NEEDING TO R/S FOLLOW UP AND ECHO FROM 10.23.23, PREFERS ANYTIME IN NOVEMBER. PT IS NOT CURRENTLY HAVING ANY CARDIAC ISSUES. PT'S DAUGHTER CALLED AND LEFT VM TO R/S ECHO HAS NOT RECEIVED A CALL BACK.

## 2023-12-26 ENCOUNTER — TELEPHONE (OUTPATIENT)
Dept: CARDIOLOGY | Facility: CLINIC | Age: 88
End: 2023-12-26
Payer: MEDICARE

## 2023-12-27 ENCOUNTER — HOSPITAL ENCOUNTER (OUTPATIENT)
Dept: CARDIOLOGY | Facility: HOSPITAL | Age: 88
Discharge: HOME | End: 2023-12-27
Admitting: INTERNAL MEDICINE
Payer: MEDICARE

## 2023-12-27 ENCOUNTER — OFFICE VISIT (OUTPATIENT)
Age: 88
End: 2023-12-27
Payer: MEDICARE

## 2023-12-27 VITALS
DIASTOLIC BLOOD PRESSURE: 78 MMHG | BODY MASS INDEX: 21.62 KG/M2 | HEIGHT: 70 IN | SYSTOLIC BLOOD PRESSURE: 122 MMHG | HEART RATE: 60 BPM | WEIGHT: 151 LBS

## 2023-12-27 DIAGNOSIS — Z95.2 S/P TAVR (TRANSCATHETER AORTIC VALVE REPLACEMENT): ICD-10-CM

## 2023-12-27 DIAGNOSIS — Z95.2 S/P TAVR (TRANSCATHETER AORTIC VALVE REPLACEMENT): Primary | ICD-10-CM

## 2023-12-27 DIAGNOSIS — R06.09 DOE (DYSPNEA ON EXERTION): ICD-10-CM

## 2023-12-27 DIAGNOSIS — I35.0 NONRHEUMATIC AORTIC VALVE STENOSIS: ICD-10-CM

## 2023-12-27 LAB
AORTIC ARCH: 2.4 CM
AORTIC DIMENSIONLESS INDEX: 0.6 (DI)
ASCENDING AORTA: 3.4 CM
BH CV ECHO MEAS - ACS: 1.39 CM
BH CV ECHO MEAS - AI P1/2T: 395 MSEC
BH CV ECHO MEAS - AO MAX PG: 32.6 MMHG
BH CV ECHO MEAS - AO MEAN PG: 17.2 MMHG
BH CV ECHO MEAS - AO ROOT DIAM: 2.8 CM
BH CV ECHO MEAS - AO V2 MAX: 285.7 CM/SEC
BH CV ECHO MEAS - AO V2 VTI: 59.8 CM
BH CV ECHO MEAS - AVA(I,D): 1.85 CM2
BH CV ECHO MEAS - EDV(CUBED): 114.5 ML
BH CV ECHO MEAS - EDV(MOD-SP2): 92 ML
BH CV ECHO MEAS - EDV(MOD-SP4): 102 ML
BH CV ECHO MEAS - EF(MOD-BP): 57.6 %
BH CV ECHO MEAS - EF(MOD-SP2): 56.5 %
BH CV ECHO MEAS - EF(MOD-SP4): 60.8 %
BH CV ECHO MEAS - ESV(CUBED): 30.4 ML
BH CV ECHO MEAS - ESV(MOD-SP2): 40 ML
BH CV ECHO MEAS - ESV(MOD-SP4): 40 ML
BH CV ECHO MEAS - FS: 35.7 %
BH CV ECHO MEAS - IVS/LVPW: 0.97 CM
BH CV ECHO MEAS - IVSD: 1.18 CM
BH CV ECHO MEAS - LAT PEAK E' VEL: 8.2 CM/SEC
BH CV ECHO MEAS - LV DIASTOLIC VOL/BSA (35-75): 57.9 CM2
BH CV ECHO MEAS - LV MASS(C)D: 221.1 GRAMS
BH CV ECHO MEAS - LV MAX PG: 5.4 MMHG
BH CV ECHO MEAS - LV MEAN PG: 3.1 MMHG
BH CV ECHO MEAS - LV SYSTOLIC VOL/BSA (12-30): 22.7 CM2
BH CV ECHO MEAS - LV V1 MAX: 116.4 CM/SEC
BH CV ECHO MEAS - LV V1 VTI: 28 CM
BH CV ECHO MEAS - LVIDD: 4.9 CM
BH CV ECHO MEAS - LVIDS: 3.1 CM
BH CV ECHO MEAS - LVOT AREA: 3.9 CM2
BH CV ECHO MEAS - LVOT DIAM: 2.24 CM
BH CV ECHO MEAS - LVPWD: 1.21 CM
BH CV ECHO MEAS - MED PEAK E' VEL: 4.5 CM/SEC
BH CV ECHO MEAS - MR MAX PG: 111.6 MMHG
BH CV ECHO MEAS - MR MAX VEL: 528.1 CM/SEC
BH CV ECHO MEAS - MV A DUR: 0.13 SEC
BH CV ECHO MEAS - MV A MAX VEL: 106.6 CM/SEC
BH CV ECHO MEAS - MV DEC SLOPE: 281 CM/SEC2
BH CV ECHO MEAS - MV DEC TIME: 0.3 SEC
BH CV ECHO MEAS - MV E MAX VEL: 80.9 CM/SEC
BH CV ECHO MEAS - MV E/A: 0.76
BH CV ECHO MEAS - MV MAX PG: 5 MMHG
BH CV ECHO MEAS - MV MEAN PG: 1.89 MMHG
BH CV ECHO MEAS - MV P1/2T: 96.2 MSEC
BH CV ECHO MEAS - MV V2 VTI: 42.7 CM
BH CV ECHO MEAS - MVA(P1/2T): 2.29 CM2
BH CV ECHO MEAS - MVA(VTI): 2.6 CM2
BH CV ECHO MEAS - PA ACC TIME: 0.13 SEC
BH CV ECHO MEAS - PA V2 MAX: 142.5 CM/SEC
BH CV ECHO MEAS - PULM A REVS DUR: 0.16 SEC
BH CV ECHO MEAS - PULM A REVS VEL: 28.1 CM/SEC
BH CV ECHO MEAS - PULM DIAS VEL: 35.4 CM/SEC
BH CV ECHO MEAS - PULM S/D: 1.77
BH CV ECHO MEAS - PULM SYS VEL: 62.6 CM/SEC
BH CV ECHO MEAS - RAP SYSTOLE: 3 MMHG
BH CV ECHO MEAS - RV MAX PG: 4.3 MMHG
BH CV ECHO MEAS - RV V1 MAX: 103.6 CM/SEC
BH CV ECHO MEAS - RV V1 VTI: 25.6 CM
BH CV ECHO MEAS - RVSP: 25.2 MMHG
BH CV ECHO MEAS - SI(MOD-SP2): 29.5 ML/M2
BH CV ECHO MEAS - SI(MOD-SP4): 35.2 ML/M2
BH CV ECHO MEAS - SUP REN AO DIAM: 1.8 CM
BH CV ECHO MEAS - SV(LVOT): 110.4 ML
BH CV ECHO MEAS - SV(MOD-SP2): 52 ML
BH CV ECHO MEAS - SV(MOD-SP4): 62 ML
BH CV ECHO MEAS - TAPSE (>1.6): 2.6 CM
BH CV ECHO MEAS - TR MAX PG: 22.2 MMHG
BH CV ECHO MEAS - TR MAX VEL: 235.6 CM/SEC
BH CV ECHO MEASUREMENTS AVERAGE E/E' RATIO: 12.74
BH CV ECHO SHUNT ASSESSMENT PERFORMED (HIDDEN SCRIPTING): 1
BH CV XLRA - RV BASE: 4 CM
BH CV XLRA - RV LENGTH: 6.7 CM
BH CV XLRA - RV MID: 2.19 CM
BH CV XLRA - TDI S': 13.2 CM/SEC
LEFT ATRIUM VOLUME INDEX: 32.3 ML/M2
SINUS: 2.7 CM
STJ: 2.6 CM

## 2023-12-27 PROCEDURE — 93306 TTE W/DOPPLER COMPLETE: CPT

## 2023-12-27 RX ORDER — ALFUZOSIN HYDROCHLORIDE 10 MG/1
10 TABLET, EXTENDED RELEASE ORAL DAILY
COMMUNITY
Start: 2023-11-08

## 2023-12-27 RX ORDER — DULOXETIN HYDROCHLORIDE 60 MG/1
60 CAPSULE, DELAYED RELEASE ORAL DAILY
COMMUNITY
Start: 2023-09-24

## 2023-12-27 RX ORDER — MULTIVIT WITH MINERALS/LUTEIN
250 TABLET ORAL DAILY
COMMUNITY

## 2023-12-27 NOTE — PROGRESS NOTES
Date of Office Visit: 23  \Encounter Provider: Brna Burdick MD  Place of Service: [facility] CARDIOLOGY  Patient Name: Durga Canales  :1935    Chief Complaint   Patient presents with    1 Year Post TAVR   Chronic heart failure with preserved LVEF    HPI:   88 y.o. male with a medical history of severe degenerative aortic stenosis, malnutrition, Crohn's disease, deficiency anemia and chronic heart failure with preserved ejection fraction. He previously underwent evaluation in the [place] system with a TTE 2022 with evidence of at least moderate to severe aortic valve stenosis if not severe.  He initially presented tome on 11/10/2022 with complaints of more fatigue and worsening shortness of breath with minimal levels of activity over the last 6 months.  Due to patient being symptomatic and his mean gradient and peak velocity near severe range, he underwent limited echo and left right heart cath to further evaluate valvular heart disease.      Patient underwent transcatheter aortic valve replacement with a 23 mm Almazan Noe Ultra TAVR valve on 2022.  Post echocardiogram showed prosthetic valve in place with normal peak and mean gradients.  Trivial paravalvular regurgitation was documented at that time.  Transthoracic echocardiogram today looks good.  Mean gradient is still stable at 17 mmHg across the aortic valve.  The paravalvular aortic regurgitation looks to be a little bit worse at mild.  He still states that he feels great.  He denies any chest pain or dyspnea on exertion.  His blood pressure and heart rate are well-controlled.  He is on Plavix monotherapy.        Previous testing and notes have been reviewed by me.     Past Medical History:   Diagnosis Date    Aortic valve stenosis     Arthritis     Crohn disease     H/O: pneumonia 2014    High cholesterol     Iron deficiency anemia     Knee pain, bilateral        Past Surgical History:    Procedure Laterality Date    AORTIC VALVE REPAIR/REPLACEMENT N/A 2022    Procedure: TTE TRANSFEMORAL TRANSCATHETER AORTIC VALVE REPLACEMENT PERCUTANEOUS APPROACH;  Surgeon: Anuel Crews MD;  Location: [facility];  Service: Cardiothoracic;  Laterality: N/A;    AORTIC VALVE REPAIR/REPLACEMENT N/A 2022    Procedure: Transfemoral Transcatheter Aortic Valve Replacement with intraoperative TTE and possible open surgical rescue;  Surgeon: Bran Burdick MD;  Location: [facility];  Service: Cardiovascular;  Laterality: N/A;    CARDIAC CATHETERIZATION N/A 11/15/2022    Procedure: Right and Left Heart Cath;  Surgeon: Bran Burdick MD;  Location: [facility] CATH INVASIVE LOCATION;  Service: Cardiology;  Laterality: N/A;    CATARACT EXTRACTION Bilateral     COLONOSCOPY      EYE SURGERY      FINGER SURGERY Left     JOINT REPLACEMENT      PERIPHERAL ARTERIAL STENT GRAFT Left     LEG    NM ARTHRP KNE CONDYLE&PLATU MEDIAL&LAT COMPARTMENTS Left 2016    Procedure: LT TOTAL KNEE ARTHROPLASTY;  Surgeon: Real Benitez MD;  Location: [facility] OR;  Service: Orthopedics    REPLACEMENT TOTAL KNEE Right     TONSILLECTOMY         Social History     Socioeconomic History    Marital status:    Tobacco Use    Smoking status: Former     Packs/day: 1.00     Years: 15.00     Additional pack years: 0.00     Total pack years: 15.00     Types: Cigarettes     Quit date:      Years since quittin.0    Smokeless tobacco: Never   Vaping Use    Vaping Use: Never used   Substance and Sexual Activity    Alcohol use: Not Currently    Drug use: No    Sexual activity: Defer       Family History   Problem Relation Age of Onset    Brain cancer Mother 92    Heart attack Father 92    Malig Hyperthermia Neg Hx        Review of Systems   Constitutional: Negative. Negative for fever and malaise/fatigue.   HENT: Negative.  Negative for nosebleeds and sore throat.    Eyes: Negative.  Negative for  "blurred vision and double vision.   Cardiovascular: Negative.  Negative for chest pain, claudication, palpitations and syncope.   Respiratory: Negative.  Negative for cough, shortness of breath and snoring.    Endocrine: Negative.  Negative for cold intolerance, heat intolerance and polydipsia.   Hematologic/Lymphatic:             Skin: Negative.  Negative for itching, poor wound healing and rash.   Musculoskeletal: Negative.  Negative for joint pain, joint swelling, muscle weakness and myalgias.   Gastrointestinal: Negative.  Negative for abdominal pain, melena, nausea and vomiting.   Genitourinary: Negative.    Neurological: Negative.  Negative for light-headedness, loss of balance, seizures, vertigo and weakness.   Psychiatric/Behavioral: Negative.  Negative for altered mental status and depression.    Allergic/Immunologic: Negative.        No Known Allergies      Current Outpatient Medications:     atorvastatin (LIPITOR) 20 MG tablet, Take 20 mg by mouth every night., Disp: , Rfl:     celecoxib (CeleBREX) 200 MG capsule, Take 1 capsule by mouth Daily., Disp: , Rfl:     Cholecalciferol (Vitamin D) 50 MCG (2000 UT) capsule, Take 1 capsule by mouth Daily., Disp: , Rfl:     clopidogrel (PLAVIX) 75 MG tablet, Take 75 mg by mouth Daily. HOLD PER MD INSTR, Disp: , Rfl:     docusate sodium 100 MG capsule, Take 1 capsule by mouth 2 (Two) Times a Day., Disp: , Rfl:     DULoxetine (CYMBALTA) 30 MG capsule, Take 1 capsule by mouth Daily. with food, Disp: , Rfl:     ferrous sulfate 140 (45 Fe) MG tablet controlled-release tablet, Take 1 tablet by mouth Daily With Breakfast., Disp: , Rfl:     IRON DEXTRAN COMPLEX IJ, Inject  as directed As Needed., Disp: , Rfl:     Ustekinumab (STELARA IV), Infuse  into a venous catheter., Disp: , Rfl:       Objective:     Vitals:    12/27/23 1055   BP: 122/78   Pulse: 60   Weight: 68.5 kg (151 lb)   Height: 177.8 cm (70\")     Body mass index is 21.67 kg/m².       PHYSICAL " EXAM:    Constitutional:       Appearance: Healthy appearance. Well-developed and not in distress.   Eyes:      General: No scleral icterus.     Conjunctiva/sclera: Conjunctivae normal.   HENT:      Head: Normocephalic and atraumatic.   Neck:      Thyroid: No thyromegaly.      Vascular: Normal carotid pulses. No carotid bruit, hepatojugular reflux, JVD or JVR. JVD normal.      Trachea: No tracheal deviation.   Pulmonary:      Effort: Pulmonary effort is normal. No respiratory distress.      Breath sounds: Normal breath sounds. No decreased breath sounds. No wheezing. No rhonchi. No rales.   Chest:      Chest wall: Not tender to palpatation.   Cardiovascular:      PMI at left midclavicular line. Normal rate. Regular rhythm. Normal S1. Normal S2.       Murmurs: There is no murmur.      No gallop.  No click. No rub.   Pulses:     Intact distal pulses.      Carotid: 2+ bilaterally.     Radial: 2+ bilaterally.     Femoral: 2+ bilaterally.     Dorsalis pedis: 2+ bilaterally.     Posterior tibial: 2+ bilaterally.  Edema:     Peripheral edema absent.   Abdominal:      General: Bowel sounds are normal. There is no distension.      Palpations: Abdomen is soft.      Tenderness: There is no abdominal tenderness.   Musculoskeletal: Normal range of motion.         General: No tenderness or deformity.      Cervical back: Normal range of motion and neck supple. Skin:     General: Skin is warm and dry.      Findings: No erythema or rash.   Neurological:      General: No focal deficit present.      Mental Status: Alert, oriented to person, place, and time and oriented to person, place and time.      Sensory: No sensory deficit.   Psychiatric:         Behavior: Behavior normal.           ECG 12 Lead    Date/Time: 12/27/2023 11:15 AM  Performed by: Bran Burdick MD    Authorized by: Bran Burdick MD  Comparison: compared with previous ECG from 2/15/2023  Similar to previous ECG  Rhythm: sinus rhythm  Ectopy: unifocal  PVCs  Rate: normal           12/15/22    Left ventricular systolic function is normal. Calculated left ventricular EF = 62.5%    Left ventricular wall thickness is consistent with mild concentric hypertrophy. Sigmoid-shaped ventricular septum is present.    Left ventricular diastolic function is consistent with (grade II w/high LAP) pseudonormalization.    Estimated right ventricular systolic pressure from tricuspid regurgitation is normal (<35 mmHg).    Left atrial volume is mildly increased. Saline test results are positive. Late bubbles noted, likely PFO    No aortic valve regurgitation is present. There is a 23 mm TAVR valve present. The aortic valve peak and mean gradients are within defined limits. The prosthetic aortic valve is normal.      12/14/22  CONCLUSIONS:  Successful deployment of a 23 mm Noe 3 transcatheter aortic heart valve.    Right / Left Heart Cath 11/15/2022:  AO: 83/55  RA: 12/7/6  RV: 27/8  PA: 25/10/15  Wedge: 13/10/10    Saturations  PA 74%  SVC 74%  AO 94%      Cardiac index 3.6 L/min/m²  1. Left main: Normal  2. LAD: Diffuse 50% mid vessel stenosis.  3. LCX: Luminal irregularities OM2  4. RCA: Discrete 50 to 60% mid vessel stenosis  5.  Normal left and right-sided filling pressures  6.  Normal cardiac index         Assessment:     Plan:   88-year-old with a medical history of severe degenerative aortic valve stenosis, Crohn's disease, iron deficiency anemia, chronic heart failure with preserved ejection fraction who presents back after transcatheter aortic valve replacement.  He did well with that and has an excellent functioning valve with only trivial paravalvular regurgitation.  His ejection fraction is preserved.  He currently reports improved dyspnea on exertion and I would put him at [place] Heart Association class II symptoms.  He has paravalvular regurgitation on today's echocardiogram is mild.  It is a little bit worse than prior but valve is functioning well with a stable  mean gradient of 17 mmHg.    1 Severe degenerative aortic valve stenosis: Status post transcatheter aortic valve replacement as above.  Mild paravalvular regurgitation documented on today's transthoracic echocardiogram.  Gradient is stable.  - Continue Plavix monotherapy.  Tolerating without any bleeding issues.  - Consider repeat echo in 2 to 3 years.    2.  Chronic heart failure with preserved ejection fraction: Near Heart Association class II symptoms.  Euvolemic.  No changes         Your medication list            Accurate as of December 27, 2023 11:12 AM. If you have any questions, ask your nurse or doctor.                CONTINUE taking these medications        Instructions Last Dose Given Next Dose Due   atorvastatin 20 MG tablet  Commonly known as: LIPITOR      Take 20 mg by mouth every night.       celecoxib 200 MG capsule  Commonly known as: CeleBREX      Take 1 capsule by mouth Daily.       clopidogrel 75 MG tablet  Commonly known as: PLAVIX      Take 75 mg by mouth Daily. HOLD PER MD DAWN       docusate sodium 100 MG capsule      Take 1 capsule by mouth 2 (Two) Times a Day.       DULoxetine 30 MG capsule  Commonly known as: CYMBALTA      Take 1 capsule by mouth Daily. with food       ferrous sulfate 140 (45 Fe) MG tablet controlled-release tablet      Take 1 tablet by mouth Daily With Breakfast.       IRON DEXTRAN COMPLEX IJ      Inject  as directed As Needed.       STELARA IV      Infuse  into a venous catheter.       Vitamin D 50 MCG (2000 UT) capsule      Take 1 capsule by mouth Daily.

## 2025-01-28 NOTE — PROGRESS NOTES
Subjective:     Encounter Date:01/29/2025      Patient ID: Durga Canales is a 89 y.o. male.    Chief Complaint: Follow-up CHF, TAVR 5.  History of Present Illness  This is an 89-year-old man who follows with Dr. Burdick and is new to me today.  He has a past medical history of severe degenerative aortic stenosis, malnutrition, Crohn's disease, deficiency anemia and chronic heart failure with preserved EF.  He previously underwent evaluation in the [place] system with a TTE 8/30/2022 with evidence of at least moderate to severe aortic valve stenosis if not severe. He initially presented tome on 11/10/2022 with complaints of more fatigue and worsening shortness of breath with minimal levels of activity over the last 6 months. Due to patient being symptomatic and his mean gradient and peak velocity near severe range, he underwent limited echo and left right heart cath to further evaluate valvular heart disease.     In December 2022 he underwent transcatheter aortic valve replacement with a 23 mm Almazan ANTWAN ultra TAVR.  Post echo showed prosthetic valve in place with normal peak and mean gradients.  There was trivial paravalvular regurgitation.  He underwent an echocardiogram at his 1 year follow-up that showed that his mean gradient was stable at 17 mmHg across the aortic valve.  The paravalvular aortic regurgitation look to be a little bit worse but is still mild.  He was feeling well and no changes were made.      He is here today for follow-up visit.  He continues to do well with no complaints.  He says his blood pressures at home have been in the 130s over 60s.  No complaints of chest pain, shortness of breath, palpitations, dizziness or syncope.  No swelling in his legs, orthopnea or PND.      I have reviewed and updated as appropriate allergies, current medications, past family history, past medical history, past surgical history and problem list.    Review of Systems   Constitutional: Positive for  malaise/fatigue. Negative for fever, weight gain and weight loss.   HENT:  Negative for congestion, hoarse voice and sore throat.    Eyes:  Negative for blurred vision and double vision.   Cardiovascular:  Negative for chest pain, dyspnea on exertion, leg swelling, orthopnea, palpitations and syncope.   Respiratory:  Negative for cough, shortness of breath and wheezing.    Gastrointestinal:  Negative for abdominal pain, hematemesis, hematochezia and melena.   Genitourinary:  Negative for dysuria and hematuria.   Neurological:  Negative for dizziness, headaches, light-headedness and numbness.   Psychiatric/Behavioral:  Negative for depression. The patient is not nervous/anxious.          Current Outpatient Medications:     alfuzosin (UROXATRAL) 10 MG 24 hr tablet, Take 1 tablet by mouth Daily., Disp: , Rfl:     amLODIPine (NORVASC) 2.5 MG tablet, Take 1 tablet by mouth Daily., Disp: , Rfl:     atorvastatin (LIPITOR) 20 MG tablet, Take 1 tablet by mouth Every Night., Disp: , Rfl:     celecoxib (CeleBREX) 200 MG capsule, Take 1 capsule by mouth Daily., Disp: , Rfl:     Cholecalciferol (Vitamin D) 50 MCG (2000 UT) capsule, Take 1 capsule by mouth Daily., Disp: , Rfl:     clopidogrel (PLAVIX) 75 MG tablet, Take 1 tablet by mouth Daily. HOLD PER MD INSTR, Disp: , Rfl:     docusate sodium 100 MG capsule, Take 1 capsule by mouth 2 (Two) Times a Day., Disp: , Rfl:     DULoxetine (CYMBALTA) 60 MG capsule, Take 1 capsule by mouth Daily., Disp: , Rfl:     ferrous sulfate 140 (45 Fe) MG tablet controlled-release tablet, Take 1 tablet by mouth Daily With Breakfast., Disp: , Rfl:     IRON DEXTRAN COMPLEX IJ, Inject  as directed As Needed., Disp: , Rfl:     vitamin C (ASCORBIC ACID) 250 MG tablet, Take 1 tablet by mouth Daily., Disp: , Rfl:     Past Medical History:   Diagnosis Date    Aortic valve stenosis     Arthritis     Crohn disease     H/O: pneumonia 2014    High cholesterol     Iron deficiency anemia     Knee pain,  bilateral        Past Surgical History:   Procedure Laterality Date    AORTIC VALVE REPAIR/REPLACEMENT N/A 2022    Procedure: TTE TRANSFEMORAL TRANSCATHETER AORTIC VALVE REPLACEMENT PERCUTANEOUS APPROACH;  Surgeon: Anuel Crews MD;  Location: [facility];  Service: Cardiothoracic;  Laterality: N/A;    AORTIC VALVE REPAIR/REPLACEMENT N/A 2022    Procedure: Transfemoral Transcatheter Aortic Valve Replacement with intraoperative TTE and possible open surgical rescue;  Surgeon: Bran Burdick MD;  Location: [facility];  Service: Cardiovascular;  Laterality: N/A;    CARDIAC CATHETERIZATION N/A 11/15/2022    Procedure: Right and Left Heart Cath;  Surgeon: Bran Burdick MD;  Location: [facility] CATH INVASIVE LOCATION;  Service: Cardiology;  Laterality: N/A;    CATARACT EXTRACTION Bilateral     COLONOSCOPY      EYE SURGERY      FINGER SURGERY Left     JOINT REPLACEMENT      PERIPHERAL ARTERIAL STENT GRAFT Left     LEG    IA ARTHRP KNE CONDYLE&PLATU MEDIAL&LAT COMPARTMENTS Left 2016    Procedure: LT TOTAL KNEE ARTHROPLASTY;  Surgeon: Real Benitez MD;  Location: [facility];  Service: Orthopedics    REPLACEMENT TOTAL KNEE Right     TONSILLECTOMY         Family History   Problem Relation Age of Onset    Brain cancer Mother 92    Heart attack Father 92    Malig Hyperthermia Neg Hx        Social History     Tobacco Use    Smoking status: Former     Current packs/day: 0.00     Average packs/day: 1 pack/day for 15.0 years (15.0 ttl pk-yrs)     Types: Cigarettes     Start date:      Quit date:      Years since quittin.1    Smokeless tobacco: Never   Vaping Use    Vaping status: Never Used   Substance Use Topics    Alcohol use: Not Currently    Drug use: No         ECG 12 Lead    Date/Time: 2025 3:40 PM  Performed by: Reba Wilhelm APRN    Authorized by: Reba Wilhelm APRN  Comparison: compared with previous ECG from 2023  Similar to previous  "ECG  Rhythm: sinus rhythm  Conduction: non-specific intraventricular conduction delay  Other findings: left ventricular hypertrophy             Objective:     Visit Vitals  /70 (BP Location: Right arm, Patient Position: Sitting, Cuff Size: Adult)   Pulse 74   Ht 177.8 cm (70\")   Wt 72.1 kg (159 lb)   BMI 22.81 kg/m²             Physical Exam  Constitutional:       Appearance: Normal appearance. He is normal weight.   HENT:      Head: Normocephalic.   Neck:      Vascular: No carotid bruit.   Cardiovascular:      Rate and Rhythm: Normal rate and regular rhythm.      Chest Wall: PMI is not displaced.      Pulses: Normal pulses.           Radial pulses are 2+ on the right side and 2+ on the left side.        Posterior tibial pulses are 2+ on the right side and 2+ on the left side.      Heart sounds: Normal heart sounds. No murmur heard.     No friction rub. No gallop.   Pulmonary:      Effort: Pulmonary effort is normal.      Breath sounds: Normal breath sounds.   Abdominal:      General: Bowel sounds are normal. There is no distension.      Palpations: Abdomen is soft.   Musculoskeletal:      Right lower leg: No edema.      Left lower leg: No edema.   Skin:     General: Skin is warm and dry.      Capillary Refill: Capillary refill takes less than 2 seconds.   Neurological:      Mental Status: He is alert and oriented to person, place, and time.   Psychiatric:         Mood and Affect: Mood normal.         Behavior: Behavior normal.         Thought Content: Thought content normal.          Lab Review:   Lipid Panel          3/27/2024    11:56   Lipid Panel   Total Cholesterol 151       Triglycerides 80       HDL Cholesterol 46       VLDL Cholesterol 16       LDL Cholesterol  89          Details          This result is from an external source.                 Cardiac Procedures:   12/15/22    Left ventricular systolic function is normal. Calculated left ventricular EF = 62.5%    Left ventricular wall thickness is " consistent with mild concentric hypertrophy. Sigmoid-shaped ventricular septum is present.    Left ventricular diastolic function is consistent with (grade II w/high LAP) pseudonormalization.    Estimated right ventricular systolic pressure from tricuspid regurgitation is normal (<35 mmHg).    Left atrial volume is mildly increased. Saline test results are positive. Late bubbles noted, likely PFO    No aortic valve regurgitation is present. There is a 23 mm TAVR valve present. The aortic valve peak and mean gradients are within defined limits. The prosthetic aortic valve is normal.        12/14/22  CONCLUSIONS:  Successful deployment of a 23 mm Noe 3 transcatheter aortic heart valve.     Right / Left Heart Cath 11/15/2022:  AO: 83/55  RA: 12/7/6  RV: 27/8  PA: 25/10/15  Wedge: 13/10/10    Saturations  PA 74%  SVC 74%  AO 94%      Cardiac index 3.6 L/min/m²  1. Left main: Normal  2. LAD: Diffuse 50% mid vessel stenosis.  3. LCX: Luminal irregularities OM2  4. RCA: Discrete 50 to 60% mid vessel stenosis  5.  Normal left and right-sided filling pressures  6.  Normal cardiac index        Assessment:         Diagnoses and all orders for this visit:    1. Nonrheumatic aortic valve stenosis (Primary)  -     Adult Transthoracic Echo Complete w/ Color, Spectral and Contrast if Necessary Per Protocol; Future    Other orders  -     ECG 12 Lead            Plan:         Severe aortic stenosis: s/p TAVR in December 2022. Echo in December 2023 showed stable gradients. On Plavix. Will repeat echo at next visit.  Chronic HFpEF: appears compensated on exam. No changes at this time.    Thank you for allowing me to participate in this patient's care. Please call with any questions or concerns. Mr Canales will follow up with Dr. Burdick in 1 year with repeat echo.          Your medication list            Accurate as of January 29, 2025  3:41 PM. If you have any questions, ask your nurse or doctor.                CONTINUE taking these  medications        Instructions Last Dose Given Next Dose Due   alfuzosin 10 MG 24 hr tablet  Commonly known as: UROXATRAL      Take 1 tablet by mouth Daily.       amLODIPine 2.5 MG tablet  Commonly known as: NORVASC      Take 1 tablet by mouth Daily.       atorvastatin 20 MG tablet  Commonly known as: LIPITOR      Take 1 tablet by mouth Every Night.       celecoxib 200 MG capsule  Commonly known as: CeleBREX      Take 1 capsule by mouth Daily.       clopidogrel 75 MG tablet  Commonly known as: PLAVIX      Take 1 tablet by mouth Daily. HOLD PER MD INSTR       docusate sodium 100 MG capsule      Take 1 capsule by mouth 2 (Two) Times a Day.       DULoxetine 60 MG capsule  Commonly known as: CYMBALTA      Take 1 capsule by mouth Daily.       ferrous sulfate 140 (45 Fe) MG tablet controlled-release tablet      Take 1 tablet by mouth Daily With Breakfast.       IRON DEXTRAN COMPLEX IJ      Inject  as directed As Needed.       vitamin C 250 MG tablet  Commonly known as: ASCORBIC ACID      Take 1 tablet by mouth Daily.       Vitamin D 50 MCG (2000 UT) capsule      Take 1 capsule by mouth Daily.                  Reba Wilhelm, APRSCARLETT  01/29/25  3:24 PM EST

## 2025-01-29 ENCOUNTER — OFFICE VISIT (OUTPATIENT)
Dept: CARDIOLOGY | Facility: CLINIC | Age: 89
End: 2025-01-29
Payer: MEDICARE

## 2025-01-29 VITALS
WEIGHT: 159 LBS | SYSTOLIC BLOOD PRESSURE: 134 MMHG | DIASTOLIC BLOOD PRESSURE: 70 MMHG | HEIGHT: 70 IN | BODY MASS INDEX: 22.76 KG/M2 | HEART RATE: 74 BPM

## 2025-01-29 DIAGNOSIS — I35.0 NONRHEUMATIC AORTIC VALVE STENOSIS: Primary | ICD-10-CM

## 2025-01-29 RX ORDER — AMLODIPINE BESYLATE 2.5 MG/1
2.5 TABLET ORAL DAILY
COMMUNITY

## (undated) DEVICE — INTRO SHEATH ART/FEM ENGAGE .035 8F12CM

## (undated) DEVICE — PK PERFUS CUST W/CARDIOPLEGIA

## (undated) DEVICE — GLIDESHEATH BASIC HYDROPHILIC COATED INTRODUCER SHEATH: Brand: GLIDESHEATH

## (undated) DEVICE — SUT GORE TT13 CV5 5N02A

## (undated) DEVICE — TAVR: Brand: MEDLINE INDUSTRIES, INC.

## (undated) DEVICE — CONTAINER,SPECIMEN,OR STERILE,4OZ: Brand: MEDLINE

## (undated) DEVICE — TRANSD PRESS STOPCOCK1WAY CABL48IN

## (undated) DEVICE — KT MANIFLD CARDIAC

## (undated) DEVICE — SUT SILK 2 SUTUPAK TIE 60IN SA8H 2STRAND

## (undated) DEVICE — GLIDESHEATH SLENDER STAINLESS STEEL KIT: Brand: GLIDESHEATH SLENDER

## (undated) DEVICE — SHORT SPIKE VENTED W/3-WAY SC: Brand: MEDLINE INDUSTRIES, INC.

## (undated) DEVICE — STPCK 1WY STD/PRESS SWIVELNUT

## (undated) DEVICE — Device

## (undated) DEVICE — SPNG LAP 18X18IN LF STRL PK/5

## (undated) DEVICE — STPCK 3WY STD/PRESS SWIVELNUT

## (undated) DEVICE — TBG INJ CONTRL PVCCLR RIGD RA 1200PSI 10

## (undated) DEVICE — INTRO SHEATH ART/FEM ENGAGE .035 5F12CM

## (undated) DEVICE — FR5 INFINITI MULTIPAC: Brand: INFINITI

## (undated) DEVICE — CVR HNDL LT SURG ACCSSRY BLU STRL

## (undated) DEVICE — CATH DIAG IMPULSE AL1 6F 100CM

## (undated) DEVICE — SOL NS 500ML

## (undated) DEVICE — CATH DIAG IMPULSE FR4 6F 100CM

## (undated) DEVICE — TBG PRESS/MONITOR FIX M/F LL A/ 24IN STRL

## (undated) DEVICE — CVR PROB 96IN LF STRL

## (undated) DEVICE — DRESSING, SURESITE SELECT, 2.8'X3.50': Brand: MEDLINE

## (undated) DEVICE — CATH DIAG IMPULSE FR5 5F 100CM

## (undated) DEVICE — GW INQWIRE FC PTFE STR .035IN 150

## (undated) DEVICE — DRAPE,REIN 53X77,STERILE: Brand: MEDLINE

## (undated) DEVICE — HI-TORQUE SUPRA CORE .035 PERIPHERAL GUIDE WIRE .035 X 190 CM: Brand: HI-TORQUE SUPRA CORE

## (undated) DEVICE — GW EMR FIX EXCHG J STD .035 3MM 260CM

## (undated) DEVICE — SOL ISO/ALC RUB 70PCT 4OZ

## (undated) DEVICE — HI-TORQUE SUPRA CORE .035 PERIPHERAL GUIDE WIRE .035 X 300 CM: Brand: HI-TORQUE SUPRA CORE

## (undated) DEVICE — BALN PRESS WEDGE 5F 110CM

## (undated) DEVICE — EQUIPMENT COVER BAG TYPE 48” X 36” (122CM X 91CM): Brand: EQUIPMENT COVER BAG TYPE

## (undated) DEVICE — PK ATS CUST W CARDIOTOMY RESEVOIR

## (undated) DEVICE — SPNG GZ WOVN 4X4IN 12PLY 10/BX STRL

## (undated) DEVICE — DISPOSABLE ADAPTER

## (undated) DEVICE — CATH DIAG IMPULSE FL3.5 5F 100CM

## (undated) DEVICE — CATH ELECTRD PACE TEMP BI NONHEP 5F110CM

## (undated) DEVICE — HEMOCONCENTRATOR PERFUS LPS06

## (undated) DEVICE — TOWEL,OR,DSP,ST,BLUE,STD,4/PK,20PK/CS: Brand: MEDLINE

## (undated) DEVICE — ST. SORBAVIEW ULTIMATE IJ SYSTEM A,C: Brand: CENTURION

## (undated) DEVICE — PK CATH CARD 40

## (undated) DEVICE — GW XCHG AMPLTZ XSTIF PTFE CRV .035 3X260

## (undated) DEVICE — PERCLOSE™ PROSTYLE™ SUTURE-MEDIATED CLOSURE AND REPAIR SYSTEM: Brand: PERCLOSE™ PROSTYLE™

## (undated) DEVICE — GLV SURG BIOGEL LTX PF 7 1/2

## (undated) DEVICE — DECANTER BAG 9": Brand: MEDLINE INDUSTRIES, INC.

## (undated) DEVICE — 3M™ IOBAN™ 2 ANTIMICROBIAL INCISE DRAPE 6650EZ: Brand: IOBAN™ 2

## (undated) DEVICE — SOL IRR NACL 0.9PCT BT 1000ML

## (undated) DEVICE — SOL IRR H2O BTL 1000ML STRL

## (undated) DEVICE — SOL NACL 0.9PCT 1000ML

## (undated) DEVICE — BIOPATCH™ ANTIMICROBIAL DRESSING WITH CHLORHEXIDINE GLUCONATE IS A HYDROPHILLIC POLYURETHANE ABSORPTIVE FOAM WITH CHLORHEXIDINE GLUCONATE (CHG) WHICH INHIBITS BACTERIAL GROWTH UNDER THE DRESSING. THE DRESSING IS INTENDED TO BE USED TO ABSORB EXUDATE, COVER A WOUND CAUSED BY VASCULAR AND NONVASCULAR PERCUTANEOUS MEDICAL DEVICES DURING SURGERY, AS WELL AS REDUCE LOCAL INFECTION AND COLONIZATION OF MICROORGANISMS.: Brand: BIOPATCH

## (undated) DEVICE — CATH DIAG IMPULSE PIG 5F 100CM

## (undated) DEVICE — CATH DIAG IMPULSE PIG145 6F 110CM

## (undated) DEVICE — SNAP KAP: Brand: UNBRANDED

## (undated) DEVICE — 3M™ BAIR HUGGER® UNDERBODY BLANKET, FULL ACCESS, 10 PER CASE 63500: Brand: BAIR HUGGER™

## (undated) DEVICE — TRY INTRO PERC 6F

## (undated) DEVICE — TBG PRESS 96IN M/F ROT BRAID: Brand: MEDLINE INDUSTRIES, INC.

## (undated) DEVICE — SENSR CERBRL O2 PK/2

## (undated) DEVICE — GLV SURG BIOGEL LTX PF 8

## (undated) DEVICE — LABEL SHEET CUSTOM 2X2 YELLOW: Brand: MEDLINE INDUSTRIES, INC.

## (undated) DEVICE — INTRO SHEATH ART/FEM ENGAGE .035 6F12CM